# Patient Record
Sex: FEMALE | Race: WHITE | NOT HISPANIC OR LATINO | Employment: OTHER | URBAN - METROPOLITAN AREA
[De-identification: names, ages, dates, MRNs, and addresses within clinical notes are randomized per-mention and may not be internally consistent; named-entity substitution may affect disease eponyms.]

---

## 2023-10-17 ENCOUNTER — TELEPHONE (OUTPATIENT)
Dept: PRIMARY CARE | Facility: CLINIC | Age: 63
End: 2023-10-17
Payer: COMMERCIAL

## 2023-10-17 DIAGNOSIS — I10 PRIMARY HYPERTENSION: ICD-10-CM

## 2023-10-17 DIAGNOSIS — Z00.00 WELL ADULT EXAM: Primary | ICD-10-CM

## 2023-10-17 DIAGNOSIS — E03.9 HYPOTHYROIDISM, UNSPECIFIED TYPE: ICD-10-CM

## 2023-10-17 DIAGNOSIS — E78.00 HYPERCHOLESTEROLEMIA: ICD-10-CM

## 2023-10-25 DIAGNOSIS — G43.909 MIGRAINE WITHOUT STATUS MIGRAINOSUS, NOT INTRACTABLE, UNSPECIFIED MIGRAINE TYPE: ICD-10-CM

## 2023-10-25 RX ORDER — SUMATRIPTAN 20 MG/1
SPRAY NASAL
Qty: 18 EACH | Refills: 0 | Status: SHIPPED | OUTPATIENT
Start: 2023-10-25 | End: 2023-12-07 | Stop reason: SDUPTHER

## 2023-12-04 ENCOUNTER — LAB (OUTPATIENT)
Dept: LAB | Facility: LAB | Age: 63
End: 2023-12-04
Payer: COMMERCIAL

## 2023-12-04 DIAGNOSIS — E78.00 HYPERCHOLESTEROLEMIA: ICD-10-CM

## 2023-12-04 DIAGNOSIS — E03.9 HYPOTHYROIDISM, UNSPECIFIED TYPE: ICD-10-CM

## 2023-12-04 DIAGNOSIS — Z00.00 WELL ADULT EXAM: ICD-10-CM

## 2023-12-04 DIAGNOSIS — I10 PRIMARY HYPERTENSION: ICD-10-CM

## 2023-12-04 LAB
ALBUMIN SERPL-MCNC: 4.2 G/DL (ref 3.5–5)
ALP BLD-CCNC: 61 U/L (ref 35–125)
ALT SERPL-CCNC: 13 U/L (ref 5–40)
ANION GAP SERPL CALC-SCNC: 12 MMOL/L
AST SERPL-CCNC: 15 U/L (ref 5–40)
BASOPHILS # BLD AUTO: 0.05 X10*3/UL (ref 0–0.1)
BASOPHILS NFR BLD AUTO: 0.8 %
BILIRUB SERPL-MCNC: 0.4 MG/DL (ref 0.1–1.2)
BUN SERPL-MCNC: 14 MG/DL (ref 8–25)
CALCIUM SERPL-MCNC: 9.7 MG/DL (ref 8.5–10.4)
CHLORIDE SERPL-SCNC: 104 MMOL/L (ref 97–107)
CHOLEST SERPL-MCNC: 164 MG/DL (ref 133–200)
CHOLEST/HDLC SERPL: 3.5 {RATIO}
CO2 SERPL-SCNC: 26 MMOL/L (ref 24–31)
CREAT SERPL-MCNC: 0.9 MG/DL (ref 0.4–1.6)
EOSINOPHIL # BLD AUTO: 0.14 X10*3/UL (ref 0–0.7)
EOSINOPHIL NFR BLD AUTO: 2.2 %
ERYTHROCYTE [DISTWIDTH] IN BLOOD BY AUTOMATED COUNT: 12.3 % (ref 11.5–14.5)
GFR SERPL CREATININE-BSD FRML MDRD: 72 ML/MIN/1.73M*2
GLUCOSE SERPL-MCNC: 85 MG/DL (ref 65–99)
HCT VFR BLD AUTO: 50.4 % (ref 36–46)
HDLC SERPL-MCNC: 47 MG/DL
HGB BLD-MCNC: 15.8 G/DL (ref 12–16)
IMM GRANULOCYTES # BLD AUTO: 0.02 X10*3/UL (ref 0–0.7)
IMM GRANULOCYTES NFR BLD AUTO: 0.3 % (ref 0–0.9)
LDLC SERPL CALC-MCNC: 96 MG/DL (ref 65–130)
LYMPHOCYTES # BLD AUTO: 2.53 X10*3/UL (ref 1.2–4.8)
LYMPHOCYTES NFR BLD AUTO: 39.1 %
MCH RBC QN AUTO: 30 PG (ref 26–34)
MCHC RBC AUTO-ENTMCNC: 31.3 G/DL (ref 32–36)
MCV RBC AUTO: 96 FL (ref 80–100)
MONOCYTES # BLD AUTO: 0.84 X10*3/UL (ref 0.1–1)
MONOCYTES NFR BLD AUTO: 13 %
NEUTROPHILS # BLD AUTO: 2.89 X10*3/UL (ref 1.2–7.7)
NEUTROPHILS NFR BLD AUTO: 44.6 %
NRBC BLD-RTO: 0 /100 WBCS (ref 0–0)
PLATELET # BLD AUTO: 231 X10*3/UL (ref 150–450)
POTASSIUM SERPL-SCNC: 4.7 MMOL/L (ref 3.4–5.1)
PROT SERPL-MCNC: 7.1 G/DL (ref 5.9–7.9)
RBC # BLD AUTO: 5.27 X10*6/UL (ref 4–5.2)
SODIUM SERPL-SCNC: 142 MMOL/L (ref 133–145)
TRIGL SERPL-MCNC: 107 MG/DL (ref 40–150)
TSH SERPL DL<=0.05 MIU/L-ACNC: 1.46 MIU/L (ref 0.27–4.2)
WBC # BLD AUTO: 6.5 X10*3/UL (ref 4.4–11.3)

## 2023-12-04 PROCEDURE — 85025 COMPLETE CBC W/AUTO DIFF WBC: CPT

## 2023-12-04 PROCEDURE — 80061 LIPID PANEL: CPT

## 2023-12-04 PROCEDURE — 36415 COLL VENOUS BLD VENIPUNCTURE: CPT

## 2023-12-04 PROCEDURE — 84443 ASSAY THYROID STIM HORMONE: CPT

## 2023-12-04 PROCEDURE — 80053 COMPREHEN METABOLIC PANEL: CPT

## 2023-12-06 ENCOUNTER — OFFICE VISIT (OUTPATIENT)
Dept: PRIMARY CARE | Facility: CLINIC | Age: 63
End: 2023-12-06
Payer: COMMERCIAL

## 2023-12-06 VITALS
TEMPERATURE: 98.2 F | BODY MASS INDEX: 35.97 KG/M2 | SYSTOLIC BLOOD PRESSURE: 122 MMHG | HEART RATE: 73 BPM | OXYGEN SATURATION: 95 % | DIASTOLIC BLOOD PRESSURE: 80 MMHG | HEIGHT: 63 IN | WEIGHT: 203 LBS

## 2023-12-06 DIAGNOSIS — B07.9 VERRUCA: Primary | ICD-10-CM

## 2023-12-06 DIAGNOSIS — M79.645 FINGER PAIN, LEFT: ICD-10-CM

## 2023-12-06 PROBLEM — M75.101 TEAR OF RIGHT ROTATOR CUFF: Status: RESOLVED | Noted: 2023-12-06 | Resolved: 2023-12-06

## 2023-12-06 PROBLEM — K57.32 DIVERTICULITIS OF LARGE INTESTINE: Status: RESOLVED | Noted: 2019-12-11 | Resolved: 2023-12-06

## 2023-12-06 PROBLEM — C85.90 LYMPHOMA (MULTI): Status: ACTIVE | Noted: 2021-09-09

## 2023-12-06 PROBLEM — J44.9 CHRONIC OBSTRUCTIVE LUNG DISEASE (MULTI): Status: ACTIVE | Noted: 2023-12-06

## 2023-12-06 PROBLEM — E78.00 HYPERCHOLESTEREMIA: Status: ACTIVE | Noted: 2018-08-07

## 2023-12-06 PROBLEM — C91.10 CHRONIC LYMPHOCYTIC LEUKEMIA (MULTI): Status: ACTIVE | Noted: 2022-06-07

## 2023-12-06 PROBLEM — F32.A DEPRESSION: Status: ACTIVE | Noted: 2023-12-06

## 2023-12-06 PROBLEM — M85.80 OSTEOPENIA: Status: ACTIVE | Noted: 2023-12-06

## 2023-12-06 PROBLEM — M51.36 DDD (DEGENERATIVE DISC DISEASE), LUMBAR: Status: ACTIVE | Noted: 2023-12-06

## 2023-12-06 PROBLEM — Z90.49 S/P COLON RESECTION: Status: ACTIVE | Noted: 2023-12-06

## 2023-12-06 PROBLEM — Z90.710 S/P HYSTERECTOMY: Status: ACTIVE | Noted: 2023-12-06

## 2023-12-06 PROBLEM — F33.1 MODERATE EPISODE OF RECURRENT MAJOR DEPRESSIVE DISORDER (MULTI): Status: ACTIVE | Noted: 2019-11-06

## 2023-12-06 PROBLEM — Z85.828 HX OF BASAL CELL CARCINOMA: Status: ACTIVE | Noted: 2023-12-06

## 2023-12-06 PROBLEM — D83.0 COMMON VARIABLE IMMUNODEFICIENCY WITH PREDOMINANT ABNORMALITIES OF B-CELL NUMBERS AND FUNCTION (MULTI): Status: ACTIVE | Noted: 2023-12-06

## 2023-12-06 PROBLEM — N18.2 CHRONIC KIDNEY DISEASE, STAGE II (MILD): Status: ACTIVE | Noted: 2021-09-09

## 2023-12-06 PROBLEM — F17.211 CIGARETTE NICOTINE DEPENDENCE IN REMISSION: Status: ACTIVE | Noted: 2023-12-06

## 2023-12-06 PROBLEM — K40.20 BILATERAL INGUINAL HERNIA: Status: ACTIVE | Noted: 2023-12-06

## 2023-12-06 PROBLEM — M79.672 LEFT FOOT PAIN: Status: RESOLVED | Noted: 2019-03-28 | Resolved: 2023-12-06

## 2023-12-06 PROBLEM — E78.6 HDL DEFICIENCY: Status: ACTIVE | Noted: 2022-06-07

## 2023-12-06 PROBLEM — C44.91 BASAL CELL CARCINOMA: Status: ACTIVE | Noted: 2023-12-06

## 2023-12-06 PROBLEM — R29.818 NEUROGENIC CLAUDICATION: Status: ACTIVE | Noted: 2023-12-06

## 2023-12-06 PROBLEM — G89.29 OTHER CHRONIC PAIN: Status: ACTIVE | Noted: 2023-12-06

## 2023-12-06 PROBLEM — I10 ESSENTIAL HYPERTENSION: Status: ACTIVE | Noted: 2018-08-07

## 2023-12-06 PROBLEM — D47.2 MONOCLONAL GAMMOPATHY OF UNKNOWN SIGNIFICANCE (MGUS): Status: ACTIVE | Noted: 2023-12-06

## 2023-12-06 PROBLEM — R23.2 HOT FLASHES: Status: ACTIVE | Noted: 2023-12-06

## 2023-12-06 PROBLEM — S32.10XA SACRAL FRACTURE, CLOSED (MULTI): Status: RESOLVED | Noted: 2023-12-06 | Resolved: 2023-12-06

## 2023-12-06 PROBLEM — D84.9 IMMUNOLOGIC DEFICIENCY SYNDROME (MULTI): Status: ACTIVE | Noted: 2019-12-11

## 2023-12-06 PROBLEM — M79.661 PAIN IN RIGHT LOWER LEG: Status: RESOLVED | Noted: 2021-10-29 | Resolved: 2023-12-06

## 2023-12-06 PROBLEM — E66.9 OBESITY: Status: ACTIVE | Noted: 2019-09-11

## 2023-12-06 PROBLEM — J01.00 ACUTE MAXILLARY SINUSITIS: Status: RESOLVED | Noted: 2019-01-14 | Resolved: 2023-12-06

## 2023-12-06 PROBLEM — N95.1 MENOPAUSAL HOT FLUSHES: Status: ACTIVE | Noted: 2020-05-14

## 2023-12-06 PROBLEM — B07.0 PLANTAR WART OF LEFT FOOT: Status: ACTIVE | Noted: 2019-03-28

## 2023-12-06 PROBLEM — R05.9 COUGH: Status: RESOLVED | Noted: 2019-01-13 | Resolved: 2023-12-06

## 2023-12-06 PROBLEM — M19.019 PRIMARY LOCALIZED OSTEOARTHROSIS OF SHOULDER REGION: Status: ACTIVE | Noted: 2023-12-06

## 2023-12-06 PROBLEM — J45.30 MILD PERSISTENT ASTHMA (HHS-HCC): Status: ACTIVE | Noted: 2023-12-06

## 2023-12-06 PROBLEM — R59.1 LYMPHADENOPATHY: Status: ACTIVE | Noted: 2022-08-23

## 2023-12-06 PROBLEM — Z85.72 HX OF NON-HODGKIN'S LYMPHOMA: Status: ACTIVE | Noted: 2023-12-06

## 2023-12-06 PROBLEM — K57.90 DIVERTICULOSIS: Status: ACTIVE | Noted: 2023-12-06

## 2023-12-06 PROBLEM — L03.116 LEFT LEG CELLULITIS: Status: RESOLVED | Noted: 2021-11-22 | Resolved: 2023-12-06

## 2023-12-06 PROBLEM — M35.00 SICCA SYNDROME (MULTI): Status: ACTIVE | Noted: 2023-12-06

## 2023-12-06 PROBLEM — G47.00 INSOMNIA: Status: ACTIVE | Noted: 2019-01-14

## 2023-12-06 PROBLEM — M54.51 VERTEBROGENIC LOW BACK PAIN: Status: ACTIVE | Noted: 2023-12-06

## 2023-12-06 PROBLEM — S39.012A BACK STRAIN: Status: RESOLVED | Noted: 2023-12-06 | Resolved: 2023-12-06

## 2023-12-06 PROBLEM — R73.01 IFG (IMPAIRED FASTING GLUCOSE): Status: ACTIVE | Noted: 2022-06-07

## 2023-12-06 PROBLEM — E78.5 DYSLIPIDEMIA: Status: ACTIVE | Noted: 2023-12-06

## 2023-12-06 PROBLEM — M51.369 DDD (DEGENERATIVE DISC DISEASE), LUMBAR: Status: ACTIVE | Noted: 2023-12-06

## 2023-12-06 PROBLEM — F33.42 RECURRENT MAJOR DEPRESSION IN FULL REMISSION (CMS-HCC): Status: ACTIVE | Noted: 2020-05-14

## 2023-12-06 PROBLEM — K64.8 INTERNAL HEMORRHOIDS: Status: ACTIVE | Noted: 2023-12-06

## 2023-12-06 PROBLEM — B99.9 INFECTION: Status: RESOLVED | Noted: 2023-12-06 | Resolved: 2023-12-06

## 2023-12-06 PROBLEM — M47.817 SPONDYLOSIS WITHOUT MYELOPATHY OR RADICULOPATHY, LUMBOSACRAL REGION: Status: ACTIVE | Noted: 2023-12-06

## 2023-12-06 PROBLEM — I12.9 MALIGNANT HYPERTENSIVE CHRONIC KIDNEY DISEASE: Status: ACTIVE | Noted: 2023-12-06

## 2023-12-06 PROCEDURE — 1036F TOBACCO NON-USER: CPT | Performed by: FAMILY MEDICINE

## 2023-12-06 PROCEDURE — 99212 OFFICE O/P EST SF 10 MIN: CPT | Performed by: FAMILY MEDICINE

## 2023-12-06 PROCEDURE — 3074F SYST BP LT 130 MM HG: CPT | Performed by: FAMILY MEDICINE

## 2023-12-06 PROCEDURE — 3079F DIAST BP 80-89 MM HG: CPT | Performed by: FAMILY MEDICINE

## 2023-12-06 RX ORDER — DIPHENHYDRAMINE HCL/ZINC ACET 2 %-0.1 %
AEROSOL, SPRAY (GRAM) TOPICAL
COMMUNITY
End: 2023-12-06 | Stop reason: ALTCHOICE

## 2023-12-06 RX ORDER — LOSARTAN POTASSIUM AND HYDROCHLOROTHIAZIDE 12.5; 5 MG/1; MG/1
TABLET ORAL
COMMUNITY
End: 2023-12-06 | Stop reason: ALTCHOICE

## 2023-12-06 RX ORDER — FLUTICASONE PROPIONATE 50 MCG
SPRAY, SUSPENSION (ML) NASAL EVERY 24 HOURS
COMMUNITY
Start: 2016-05-31

## 2023-12-06 RX ORDER — HYDROXYZINE PAMOATE 25 MG/1
25 CAPSULE ORAL DAILY
COMMUNITY
Start: 2023-06-05 | End: 2023-12-06 | Stop reason: ALTCHOICE

## 2023-12-06 RX ORDER — LOSARTAN POTASSIUM 50 MG/1
TABLET ORAL
COMMUNITY
End: 2023-12-06 | Stop reason: ALTCHOICE

## 2023-12-06 RX ORDER — ESTRADIOL 1 MG/1
TABLET ORAL EVERY 24 HOURS
COMMUNITY
End: 2023-12-06 | Stop reason: ALTCHOICE

## 2023-12-06 RX ORDER — ZINC GLUCONATE 50 MG
TABLET ORAL
COMMUNITY
Start: 2022-05-24

## 2023-12-06 RX ORDER — LEVOTHYROXINE SODIUM 75 UG/1
TABLET ORAL
COMMUNITY
Start: 2020-11-27 | End: 2023-12-07 | Stop reason: SDUPTHER

## 2023-12-06 RX ORDER — NYSTATIN 100000 [USP'U]/G
POWDER TOPICAL
COMMUNITY
Start: 2019-09-27

## 2023-12-06 RX ORDER — ASPIRIN 81 MG/1
TABLET ORAL EVERY 24 HOURS
COMMUNITY
Start: 2018-09-25

## 2023-12-06 RX ORDER — PERMETHRIN 50 MG/G
CREAM TOPICAL
COMMUNITY
Start: 2016-09-30 | End: 2023-12-06 | Stop reason: ALTCHOICE

## 2023-12-06 RX ORDER — PRAVASTATIN SODIUM 10 MG/1
TABLET ORAL EVERY 24 HOURS
COMMUNITY
Start: 2022-02-07 | End: 2023-12-07 | Stop reason: SDUPTHER

## 2023-12-06 RX ORDER — NAPROXEN SODIUM 220 MG/1
1 TABLET ORAL EVERY 24 HOURS
COMMUNITY

## 2023-12-06 RX ORDER — GUAIFENESIN AND PHENYLEPHRINE HCL 400; 10 MG/1; MG/1
TABLET ORAL
COMMUNITY

## 2023-12-06 RX ORDER — BUPROPION HYDROCHLORIDE 150 MG/1
TABLET ORAL
COMMUNITY
Start: 2022-05-03

## 2023-12-06 RX ORDER — FLUOXETINE 20 MG/1
30 TABLET ORAL
COMMUNITY
Start: 2022-01-28 | End: 2023-12-06 | Stop reason: ALTCHOICE

## 2023-12-06 RX ORDER — AMITRIPTYLINE HYDROCHLORIDE 25 MG/1
TABLET, FILM COATED ORAL
COMMUNITY
End: 2023-12-06 | Stop reason: ALTCHOICE

## 2023-12-06 RX ORDER — MULTIVITAMIN WITH IRON
TABLET ORAL
COMMUNITY
Start: 2022-05-24 | End: 2023-12-06 | Stop reason: ALTCHOICE

## 2023-12-06 RX ORDER — BLACK COHOSH ROOT 540 MG
CAPSULE ORAL
COMMUNITY
End: 2023-12-06 | Stop reason: ALTCHOICE

## 2023-12-06 RX ORDER — ACETAMINOPHEN 500 MG
1 TABLET ORAL DAILY
COMMUNITY
Start: 2022-05-24

## 2023-12-06 RX ORDER — ALBUTEROL SULFATE 0.83 MG/ML
SOLUTION RESPIRATORY (INHALATION)
COMMUNITY
Start: 2023-09-07

## 2023-12-06 RX ORDER — HYDROCODONE BITARTRATE AND ACETAMINOPHEN 5; 325 MG/1; MG/1
TABLET ORAL EVERY 24 HOURS
COMMUNITY
Start: 2023-08-01

## 2023-12-06 RX ORDER — MELOXICAM 15 MG/1
TABLET ORAL
COMMUNITY
Start: 2020-11-09 | End: 2023-12-06 | Stop reason: ALTCHOICE

## 2023-12-06 RX ORDER — METHOCARBAMOL 500 MG/1
TABLET, FILM COATED ORAL EVERY 4 HOURS
COMMUNITY
End: 2023-12-06 | Stop reason: ALTCHOICE

## 2023-12-06 RX ORDER — BUDESONIDE AND FORMOTEROL FUMARATE DIHYDRATE 160; 4.5 UG/1; UG/1
AEROSOL RESPIRATORY (INHALATION)
COMMUNITY
Start: 2021-08-09

## 2023-12-06 RX ORDER — ASCORBIC ACID 500 MG
TABLET ORAL
COMMUNITY
Start: 2022-05-24

## 2023-12-06 RX ORDER — BECLOMETHASONE DIPROPIONATE HFA 40 UG/1
AEROSOL, METERED RESPIRATORY (INHALATION)
COMMUNITY
Start: 2018-09-30

## 2023-12-06 RX ORDER — ALBUTEROL SULFATE 90 UG/1
2 AEROSOL, METERED RESPIRATORY (INHALATION) EVERY 4 HOURS
COMMUNITY

## 2023-12-06 RX ORDER — EPINEPHRINE 0.22MG
AEROSOL WITH ADAPTER (ML) INHALATION EVERY 24 HOURS
COMMUNITY
Start: 2016-05-31 | End: 2023-12-06 | Stop reason: ALTCHOICE

## 2023-12-06 RX ORDER — DOXYCYCLINE HYCLATE 100 MG
100 TABLET ORAL 2 TIMES DAILY
COMMUNITY
Start: 2023-09-07 | End: 2023-12-06 | Stop reason: ALTCHOICE

## 2023-12-06 RX ORDER — GABAPENTIN 100 MG/1
CAPSULE ORAL
COMMUNITY
Start: 2023-10-04 | End: 2023-12-06 | Stop reason: ALTCHOICE

## 2023-12-06 RX ORDER — LISINOPRIL 20 MG/1
TABLET ORAL EVERY 24 HOURS
COMMUNITY
Start: 2021-06-24 | End: 2023-12-07 | Stop reason: SDUPTHER

## 2023-12-06 RX ORDER — POTASSIUM CHLORIDE 20 MEQ/1
TABLET, EXTENDED RELEASE ORAL EVERY 12 HOURS
COMMUNITY
End: 2023-12-06 | Stop reason: ALTCHOICE

## 2023-12-06 RX ORDER — AZITHROMYCIN 250 MG/1
TABLET, FILM COATED ORAL DAILY
COMMUNITY

## 2023-12-06 RX ORDER — MULTIVITAMIN/IRON/FOLIC ACID 18MG-0.4MG
TABLET ORAL
COMMUNITY

## 2023-12-06 ASSESSMENT — PAIN SCALES - GENERAL: PAINLEVEL: 8

## 2023-12-06 ASSESSMENT — PATIENT HEALTH QUESTIONNAIRE - PHQ9
2. FEELING DOWN, DEPRESSED OR HOPELESS: NOT AT ALL
SUM OF ALL RESPONSES TO PHQ9 QUESTIONS 1 AND 2: 0
1. LITTLE INTEREST OR PLEASURE IN DOING THINGS: NOT AT ALL

## 2023-12-06 NOTE — PROGRESS NOTES
"Subjective   Patient ID: oMnica Durham is a 62 y.o. female who presents for wart removal.    Cryotherapy Procedure Note    Pre-operative Diagnosis: verruca    Post-operative Diagnosis: verruca    Locations: left hand third finger    Indications: pain, chronic (present for 1.5 years)    Anesthesia: not required       Procedure Details   History of allergy to iodine: no.  Pacemaker? no.    Patient informed of risks (permanent scarring, infection, light or dark discoloration, bleeding, infection, weakness, numbness and recurrence of the lesion) and benefits of the procedure and verbal informed consent obtained.    The areas are treated with liquid nitrogen therapy, frozen until ice ball extended 1-2 mm beyond lesion, allowed to thaw, and treated again. The patient tolerated procedure well.  The patient was instructed on post-op care, warned that there may be blister formation, redness and pain. Recommend OTC analgesia as needed for pain.    /80 (BP Location: Left arm)   Pulse 73   Temp 36.8 °C (98.2 °F) (Temporal)   Ht 1.6 m (5' 3\")   Wt 92.1 kg (203 lb)   SpO2 95%   BMI 35.96 kg/m²     Condition:  Stable    Complications:  none.    Plan:  1. Instructed to keep the area dry and covered for 24-48h and clean thereafter.  2. Warning signs of infection were reviewed.    3. Recommended that the patient use OTC analgesics as needed for pain.   4. Return in 4 weeks.     "

## 2023-12-07 ENCOUNTER — TELEPHONE (OUTPATIENT)
Dept: PRIMARY CARE | Facility: CLINIC | Age: 63
End: 2023-12-07

## 2023-12-07 ENCOUNTER — OFFICE VISIT (OUTPATIENT)
Dept: PRIMARY CARE | Facility: CLINIC | Age: 63
End: 2023-12-07
Payer: COMMERCIAL

## 2023-12-07 VITALS
WEIGHT: 203 LBS | DIASTOLIC BLOOD PRESSURE: 82 MMHG | RESPIRATION RATE: 16 BRPM | HEIGHT: 63 IN | SYSTOLIC BLOOD PRESSURE: 124 MMHG | BODY MASS INDEX: 35.97 KG/M2 | OXYGEN SATURATION: 98 % | HEART RATE: 75 BPM

## 2023-12-07 DIAGNOSIS — E03.9 HYPOTHYROIDISM, UNSPECIFIED TYPE: ICD-10-CM

## 2023-12-07 DIAGNOSIS — I10 ESSENTIAL HYPERTENSION: ICD-10-CM

## 2023-12-07 DIAGNOSIS — E78.00 HYPERCHOLESTEREMIA: ICD-10-CM

## 2023-12-07 DIAGNOSIS — G43.009 MIGRAINE WITHOUT AURA AND WITHOUT STATUS MIGRAINOSUS, NOT INTRACTABLE: ICD-10-CM

## 2023-12-07 DIAGNOSIS — F33.42 RECURRENT MAJOR DEPRESSION IN FULL REMISSION (CMS-HCC): ICD-10-CM

## 2023-12-07 DIAGNOSIS — Z00.00 WELL ADULT EXAM: Primary | ICD-10-CM

## 2023-12-07 DIAGNOSIS — Z12.31 ENCOUNTER FOR SCREENING MAMMOGRAM FOR BREAST CANCER: ICD-10-CM

## 2023-12-07 PROCEDURE — 1036F TOBACCO NON-USER: CPT | Performed by: FAMILY MEDICINE

## 2023-12-07 PROCEDURE — 99213 OFFICE O/P EST LOW 20 MIN: CPT | Performed by: FAMILY MEDICINE

## 2023-12-07 PROCEDURE — 99396 PREV VISIT EST AGE 40-64: CPT | Performed by: FAMILY MEDICINE

## 2023-12-07 PROCEDURE — 3079F DIAST BP 80-89 MM HG: CPT | Performed by: FAMILY MEDICINE

## 2023-12-07 PROCEDURE — 3074F SYST BP LT 130 MM HG: CPT | Performed by: FAMILY MEDICINE

## 2023-12-07 RX ORDER — LEVOTHYROXINE SODIUM 75 UG/1
TABLET ORAL
Qty: 90 TABLET | Refills: 3 | Status: SHIPPED | OUTPATIENT
Start: 2023-12-07

## 2023-12-07 RX ORDER — SUMATRIPTAN 20 MG/1
SPRAY NASAL
Qty: 18 EACH | Refills: 2 | Status: SHIPPED | OUTPATIENT
Start: 2023-12-07 | End: 2024-05-29

## 2023-12-07 RX ORDER — GABAPENTIN 100 MG/1
100 CAPSULE ORAL 3 TIMES DAILY
COMMUNITY

## 2023-12-07 RX ORDER — LISINOPRIL 20 MG/1
20 TABLET ORAL DAILY
Qty: 90 TABLET | Refills: 1 | Status: SHIPPED | OUTPATIENT
Start: 2023-12-07 | End: 2024-06-04

## 2023-12-07 RX ORDER — PRAVASTATIN SODIUM 10 MG/1
10 TABLET ORAL NIGHTLY
Qty: 90 TABLET | Refills: 3 | Status: SHIPPED | OUTPATIENT
Start: 2023-12-07 | End: 2024-12-06

## 2023-12-07 ASSESSMENT — PAIN SCALES - GENERAL: PAINLEVEL: 3

## 2023-12-07 NOTE — PROGRESS NOTES
"Subjective   Patient ID: Monica Durham is a 62 y.o. female who presents for Annual Exam.    COVID-19 and tetanus (2016) vaccinations are UTD.  She declines influenza and shingles vaccinations.  She had a hysterectomy in 2004.  She requests an order for a screening mammogram.  DEXA is UTD.  She had a colonoscopy in 2019 performed by Dr. White.  She had one benign polyp.  She was told to repeat the colonoscopy in 10 years.  EKG is UTD.  Fasting labs were completed recently.   1) Depression: stable, under the care of psychiatrist, compliant with medications.  2) HTN: controlled, blood pressure at goal, checks blood pressure at home and usually in the 110s/70s, compliant with medication, tries to follow a low salt diet, exercises at least 4-5 days a week (aerobics and yoga).  3) Hypercholesterolemia: uncontrolled, HDL low, compliant with medication, taking fish oil last year, tries to follow a low fat diet, exercises daily.   4) Hypothyroidism: controlled, TSH normal, compliant with medication, asymptomatic.  5) Migraines: stable, usually has migraine after monthly infusions at immunologist's office, sumatriptan nasal spray helps.    Review of Systems   Constitutional: Negative.    HENT: Negative.     Eyes: Negative.    Respiratory: Negative.     Cardiovascular: Negative.    Gastrointestinal: Negative.    Genitourinary: Negative.    Musculoskeletal: Negative.    Skin: Negative.    Neurological: Negative.    Hematological: Negative.    Psychiatric/Behavioral: Negative.       Objective   /82   Pulse 75   Resp 16   Ht 1.6 m (5' 3\")   Wt 92.1 kg (203 lb)   SpO2 98%   BMI 35.96 kg/m²     Physical Exam  Vitals reviewed.   Constitutional:       Appearance: Normal appearance. She is obese.   HENT:      Head: Normocephalic and atraumatic.      Right Ear: Tympanic membrane, ear canal and external ear normal.      Left Ear: Tympanic membrane, ear canal and external ear normal.      Nose: Nose normal.      " Mouth/Throat:      Mouth: Mucous membranes are moist.      Pharynx: Oropharynx is clear.   Eyes:      Extraocular Movements: Extraocular movements intact.      Conjunctiva/sclera: Conjunctivae normal.      Pupils: Pupils are equal, round, and reactive to light.   Cardiovascular:      Rate and Rhythm: Normal rate and regular rhythm.      Pulses: Normal pulses.      Heart sounds: Normal heart sounds.   Pulmonary:      Effort: Pulmonary effort is normal.      Breath sounds: Normal breath sounds.   Chest:   Breasts:     Right: Normal.      Left: Normal.   Abdominal:      General: Bowel sounds are normal.      Palpations: Abdomen is soft.   Musculoskeletal:         General: Normal range of motion.      Cervical back: Normal range of motion and neck supple.   Lymphadenopathy:      Upper Body:      Right upper body: No axillary adenopathy.      Left upper body: No axillary adenopathy.   Skin:     General: Skin is warm and dry.   Neurological:      General: No focal deficit present.      Mental Status: She is alert and oriented to person, place, and time.   Psychiatric:         Mood and Affect: Mood normal.         Behavior: Behavior normal.         Thought Content: Thought content normal.         Judgment: Judgment normal.     Assessment/Plan   Diagnoses and all orders for this visit:  Well adult exam  PE completed.  COVID-19 and tetanus vaccinations UTD.  Patient declines influenza and shingles vaccinations.  Screening mammogram ordered.  Await results.  DEXA UTD.  Colon cancer screening UTD.  EKG UTD.  Fasting labs completed.    Repeat exam in 1 year.   Recurrent major depression in full remission (CMS/HCC)  Stable.  Continue with medications.  Keep follow-up appointments with psychiatrist.  Follow up in 1 year.  Essential hypertension  Controlled.  Continue to check blood pressure at home.  Lisinopril 20 mg tablet refilled.  Low salt diet.  Regular exercise.  Manage weight.  Follow up in 6 months.    Hypercholesteremia  Uncontrolled.  Pravastatin (Pravachol) 10 mg tablet refilled.  Patient may resume daily fish oil.     Low fat diet.  Regular exercise.  Manage weight.  Follow up in 1 year,  Hypothyroidism, unspecified type  Controlled.  Levothyroxine (Synthroid, Levoxyl) 75 mcg tablet refilled.  Follow up in 1 year.  Migraine without aura and without status migrainosus, not intractable  Stable.  SUMAtriptan (Imitrex) 20 mg/actuation nasal spray refilled.  Follow up in 1 year.  Encounter for screening mammogram for breast cancer  -     BI mammo bilateral screening tomosynthesis; Future  Other orders  -     Follow Up In Primary Care - Established; Future

## 2023-12-13 ASSESSMENT — ENCOUNTER SYMPTOMS
MUSCULOSKELETAL NEGATIVE: 1
CONSTITUTIONAL NEGATIVE: 1
PSYCHIATRIC NEGATIVE: 1
EYES NEGATIVE: 1
HEMATOLOGIC/LYMPHATIC NEGATIVE: 1
GASTROINTESTINAL NEGATIVE: 1
CARDIOVASCULAR NEGATIVE: 1
NEUROLOGICAL NEGATIVE: 1
RESPIRATORY NEGATIVE: 1

## 2024-01-09 ENCOUNTER — TELEPHONE (OUTPATIENT)
Dept: PRIMARY CARE | Facility: CLINIC | Age: 64
End: 2024-01-09
Payer: COMMERCIAL

## 2024-01-09 DIAGNOSIS — E78.00 HYPERCHOLESTEREMIA: ICD-10-CM

## 2024-01-09 RX ORDER — PRAVASTATIN SODIUM 10 MG/1
10 TABLET ORAL NIGHTLY
Qty: 90 TABLET | Refills: 3 | OUTPATIENT
Start: 2024-01-09 | End: 2025-01-08

## 2024-01-10 ENCOUNTER — OFFICE VISIT (OUTPATIENT)
Dept: PRIMARY CARE | Facility: CLINIC | Age: 64
End: 2024-01-10
Payer: COMMERCIAL

## 2024-01-10 VITALS — DIASTOLIC BLOOD PRESSURE: 80 MMHG | SYSTOLIC BLOOD PRESSURE: 140 MMHG | OXYGEN SATURATION: 99 % | HEART RATE: 81 BPM

## 2024-01-10 DIAGNOSIS — B07.9 VERRUCA: Primary | ICD-10-CM

## 2024-01-10 PROCEDURE — 3077F SYST BP >= 140 MM HG: CPT | Performed by: FAMILY MEDICINE

## 2024-01-10 PROCEDURE — 1036F TOBACCO NON-USER: CPT | Performed by: FAMILY MEDICINE

## 2024-01-10 PROCEDURE — 3079F DIAST BP 80-89 MM HG: CPT | Performed by: FAMILY MEDICINE

## 2024-01-10 RX ORDER — BUDESONIDE 0.5 MG/2ML
INHALANT ORAL
COMMUNITY
Start: 2024-01-05

## 2024-01-10 ASSESSMENT — PAIN SCALES - GENERAL: PAINLEVEL: 0-NO PAIN

## 2024-01-10 ASSESSMENT — PATIENT HEALTH QUESTIONNAIRE - PHQ9
2. FEELING DOWN, DEPRESSED OR HOPELESS: NOT AT ALL
1. LITTLE INTEREST OR PLEASURE IN DOING THINGS: NOT AT ALL
SUM OF ALL RESPONSES TO PHQ9 QUESTIONS 1 AND 2: 0

## 2024-01-10 NOTE — PROGRESS NOTES
Cryotherapy Procedure Note    Pre-operative Diagnosis: verruca    Post-operative Diagnosis: verruca    Locations: left hand third finger    Indications: pain, chronic (present for 1.5 years)    Anesthesia: not required       Procedure Details   History of allergy to iodine: no.  Pacemaker? no.    Patient informed of risks (permanent scarring, infection, light or dark discoloration, bleeding, infection, weakness, numbness and recurrence of the lesion) and benefits of the procedure and verbal informed consent obtained.    The areas are treated with liquid nitrogen therapy, frozen until ice ball extended 1-2 mm beyond lesion, allowed to thaw, and treated again. The patient tolerated procedure well.  The patient was instructed on post-op care, warned that there may be blister formation, redness and pain. Recommend OTC analgesia as needed for pain.    Condition:  Stable    Complications:  none.    Plan:  1. Instructed to keep the area dry and clean thereafter.  Covered with adhesive bandage.  2. Warning signs of infection were reviewed.    3. Recommended that the patient use OTC analgesics as needed for pain.   4. May use duct tape or salicylic acid between visits.  5. Return in 4 weeks.

## 2024-01-24 ENCOUNTER — APPOINTMENT (OUTPATIENT)
Dept: RADIOLOGY | Facility: CLINIC | Age: 64
End: 2024-01-24
Payer: COMMERCIAL

## 2024-01-26 ENCOUNTER — HOSPITAL ENCOUNTER (OUTPATIENT)
Dept: RADIOLOGY | Facility: CLINIC | Age: 64
Discharge: HOME | End: 2024-01-26
Payer: COMMERCIAL

## 2024-01-26 VITALS — HEIGHT: 63 IN | BODY MASS INDEX: 35.79 KG/M2 | WEIGHT: 202 LBS

## 2024-01-26 DIAGNOSIS — Z12.31 ENCOUNTER FOR SCREENING MAMMOGRAM FOR BREAST CANCER: ICD-10-CM

## 2024-01-26 PROCEDURE — 77067 SCR MAMMO BI INCL CAD: CPT

## 2024-01-29 ENCOUNTER — OFFICE VISIT (OUTPATIENT)
Dept: ORTHOPEDIC SURGERY | Facility: CLINIC | Age: 64
End: 2024-01-29
Payer: COMMERCIAL

## 2024-01-29 ENCOUNTER — HOSPITAL ENCOUNTER (OUTPATIENT)
Dept: RADIOLOGY | Facility: CLINIC | Age: 64
Discharge: HOME | End: 2024-01-29
Payer: COMMERCIAL

## 2024-01-29 DIAGNOSIS — M79.602 ARM PAIN, LEFT: ICD-10-CM

## 2024-01-29 DIAGNOSIS — M75.102 TEAR OF LEFT ROTATOR CUFF, UNSPECIFIED TEAR EXTENT, UNSPECIFIED WHETHER TRAUMATIC: ICD-10-CM

## 2024-01-29 DIAGNOSIS — M25.512 LEFT SHOULDER PAIN, UNSPECIFIED CHRONICITY: ICD-10-CM

## 2024-01-29 DIAGNOSIS — R29.898 LEFT ARM WEAKNESS: ICD-10-CM

## 2024-01-29 PROCEDURE — 1036F TOBACCO NON-USER: CPT | Performed by: ORTHOPAEDIC SURGERY

## 2024-01-29 PROCEDURE — 73030 X-RAY EXAM OF SHOULDER: CPT | Mod: LT

## 2024-01-29 PROCEDURE — 99213 OFFICE O/P EST LOW 20 MIN: CPT | Performed by: ORTHOPAEDIC SURGERY

## 2024-01-29 ASSESSMENT — ENCOUNTER SYMPTOMS
WHEEZING: 0
SHORTNESS OF BREATH: 0
CHILLS: 0
ARTHRALGIAS: 1
FATIGUE: 0
FEVER: 0

## 2024-01-29 ASSESSMENT — PAIN SCALES - GENERAL: PAINLEVEL_OUTOF10: 4

## 2024-01-29 ASSESSMENT — PAIN - FUNCTIONAL ASSESSMENT: PAIN_FUNCTIONAL_ASSESSMENT: 0-10

## 2024-01-29 NOTE — PROGRESS NOTES
Reason for Appointment  Chief Complaint   Patient presents with    Left Shoulder - Pain     Between shoulder and elbow     History of Present Illness  Patient is a 63 y.o. female here today for evaluation of new left shoulder pain. She has a history of a right reverse shoulder replacement from 10/2022 for a rotator cuff tear. The right shoulder is doing very well. X-rays today of the left shoulder show mild arthritis of the joint, moderate AC joint arthritis, there is some scoliosis seen on X-ray. Around 2023, she started to notice throbbing pain in the shoulder that keeps her up at night. She fell 2023 and was treated for two pelvic fractures, so she was taking it easy recently.     Past Medical History:   Diagnosis Date    Acute maxillary sinusitis 2019    Back strain 2023    Non Hodgkin's lymphoma (CMS/HCC)     Sacral fracture, closed (CMS/[facility]) 2023    Skin cancer     Tear of right rotator cuff 2023       Past Surgical History:   Procedure Laterality Date    HYSTERECTOMY      OTHER SURGICAL HISTORY  2022     section    OTHER SURGICAL HISTORY  2022    Hysterectomy       Medication Documentation Review Audit       Reviewed by Marissa Prieto MA (Medical Assistant) on 24 at 1408      Medication Order Taking? Sig Documenting Provider Last Dose Status   albuterol 2.5 mg /3 mL (0.083 %) nebulizer solution [id number] Yes 3 MILLILITER(S) EVERY 4 HOURS AS NEEDED INHALATION Historical Provider, MD Taking Active   albuterol 90 mcg/actuation inhaler [id number] Yes 2 puffs every 4 hours. Historical Provider, MD Taking Active   ascorbic acid (Vitamin C) 500 mg tablet [id number] Yes Take by mouth. Historical Provider, MD Taking Active   aspirin 81 mg EC tablet [id number] Yes once every 24 hours. Historical Provider, MD Taking Active   azithromycin (Zithromax) 250 mg tablet [id number] Yes Take by mouth once daily. Historical Provider, MD Taking Active   beclomethasone HFA (Qvar  RediHaler) 40 mcg/actuation inhaler [id number] Yes 2 puff(s), Inhale, bid, # 1 EA, 3 Refill(s), JASIEL, Type: Maintenance, Pharmacy: [facility]/pharmacy #[id number] Historical Provider, MD Taking Active   budesonide (Pulmicort) 0.5 mg/2 mL nebulizer solution [id number] Yes USE 2 ML DAILY IN SINUS RINSE Historical Provider, MD Taking Active   buPROPion XL (Wellbutrin XL) 150 mg 24 hr tablet [id number] Yes Take by mouth. Historical Provider, MD Taking Active   cholecalciferol (Vitamin D-3) 50 mcg (2,000 unit) capsule [id number] Yes Take 1 capsule (50 mcg) by mouth once daily. Historical Provider, MD Taking Active   fluticasone (Flonase) 50 mcg/actuation nasal spray [id number] Yes once every 24 hours. Historical Provider, MD Taking Active   gabapentin (Neurontin) 100 mg capsule [id number] Yes Take 1 capsule (100 mg) by mouth 3 times a day. Historical Provider, MD Taking Active   HYDROcodone-acetaminophen (Norco) 5-325 mg tablet [id number] Yes once every 24 hours. Historical Provider, MD Taking Active   levothyroxine (Synthroid, Levoxyl) 75 mcg tablet [id number] Yes TAKE 1 TABLET BY MOUTH EVERY DAY BEFORE BREAKFAST Oral for 90 days Ilana Puri,  Taking Active   lisinopril 20 mg tablet [id number] Yes Take 1 tablet (20 mg) by mouth once daily. Ilana Puri, DO Taking Active   multivitamin with minerals iron-free (Complete MV Adult 50 Plus) [id number] Yes as directed Orally Historical MD Sangeeta Taking Active   nystatin (Mycostatin) 100,000 unit/gram powder [id number] Yes 1 application to affected area Externally Twice a day prn for 90 days Hernandez Rutherford MD Taking Active   omega 3-dha-epa-fish oil (Fish OiL) 1,200 (144-216) mg capsule [id number] Yes 1 capsule (1,200 mg) once every 24 hours. Hernandez Rutherford MD Taking Active   pravastatin (Pravachol) 10 mg tablet [id number] Yes Take 1 tablet (10 mg) by mouth once daily at bedtime. Ilana Puri DO Taking Active   SUMAtriptan (Imitrex) 20 mg/actuation nasal spray [id number] Yes  USE 1 SPRAY NASALLY. MAY REPEAT IN 2 HOURS AS NEEDED. MAXIMUM 2 SPRAYS IN 24 HOURS Ilana Puri,  Taking Active   Symbicort 160-4.5 mcg/actuation inhaler [id number] Yes Inhale. Historical Provider, MD Taking Active   turmeric root extract 500 mg capsule [id number] Yes as directed Orally Historical Provider, MD Taking Active   zinc gluconate 50 mg tablet [id number] Yes Take by mouth. Historical Provider, MD Taking Active                    Allergies   Allergen Reactions    Simvastatin Myalgia    Topiramate Itching and Rash       Review of Systems   Constitutional:  Negative for chills, fatigue and fever.   Respiratory:  Negative for shortness of breath and wheezing.    Cardiovascular:  Negative for chest pain and leg swelling.   Musculoskeletal:  Positive for arthralgias.   All other systems reviewed and are negative.    Exam   On exam of the left arm, she has over 150 degrees of elevation, severe weakness in external rotation, markedly positive impingement sign, tender over the joint line, good deltoid function, good pulses, good sensation    Assessment   Encounter Diagnoses   Name Primary?    Left shoulder pain, unspecified chronicity     Tear of left rotator cuff, unspecified tear extent, unspecified whether traumatic     Arm pain, left     Left arm weakness        Plan   She has a history of a large rotator cuff tear on the right and she had an injury about 6 months ago. She has been doing exercises at home for her shoulder replacement on the right which is  doing very well. There is a high likelihood of a surgical rotator cuff tear on the left with her history of injury, continued pain, and weakness on clinical exam. Follow-up after MRI.    Aleta HAINES, attest that this documentation has been prepared under the direction and in the presence of Mayito Mercedes MD. By signing below, Mayito HAINES MD, personally performed the services described in this documentation. All medical record entries made by  the scribe were at my direction and in my presence. I have reviewed the chart and agree that the record reflects my personal performance and is accurate and complete. 01/29/24

## 2024-02-10 ENCOUNTER — HOSPITAL ENCOUNTER (OUTPATIENT)
Dept: RADIOLOGY | Facility: HOSPITAL | Age: 64
Discharge: HOME | End: 2024-02-10
Payer: COMMERCIAL

## 2024-02-10 DIAGNOSIS — M79.602 ARM PAIN, LEFT: ICD-10-CM

## 2024-02-10 DIAGNOSIS — R29.898 LEFT ARM WEAKNESS: ICD-10-CM

## 2024-02-10 DIAGNOSIS — M75.102 TEAR OF LEFT ROTATOR CUFF, UNSPECIFIED TEAR EXTENT, UNSPECIFIED WHETHER TRAUMATIC: ICD-10-CM

## 2024-02-10 PROCEDURE — 73221 MRI JOINT UPR EXTREM W/O DYE: CPT | Mod: LT

## 2024-02-19 ENCOUNTER — OFFICE VISIT (OUTPATIENT)
Dept: ORTHOPEDIC SURGERY | Facility: CLINIC | Age: 64
End: 2024-02-19
Payer: COMMERCIAL

## 2024-02-19 DIAGNOSIS — M25.812 IMPINGEMENT OF LEFT SHOULDER: ICD-10-CM

## 2024-02-19 DIAGNOSIS — M75.102 TEAR OF LEFT ROTATOR CUFF, UNSPECIFIED TEAR EXTENT, UNSPECIFIED WHETHER TRAUMATIC: Primary | ICD-10-CM

## 2024-02-19 PROCEDURE — 1036F TOBACCO NON-USER: CPT | Performed by: ORTHOPAEDIC SURGERY

## 2024-02-19 PROCEDURE — 20611 DRAIN/INJ JOINT/BURSA W/US: CPT | Performed by: ORTHOPAEDIC SURGERY

## 2024-02-19 PROCEDURE — 99213 OFFICE O/P EST LOW 20 MIN: CPT | Performed by: ORTHOPAEDIC SURGERY

## 2024-02-19 RX ORDER — LIDOCAINE HYDROCHLORIDE 10 MG/ML
0.5 INJECTION INFILTRATION; PERINEURAL
Status: COMPLETED | OUTPATIENT
Start: 2024-02-19 | End: 2024-02-19

## 2024-02-19 RX ORDER — TRIAMCINOLONE ACETONIDE 40 MG/ML
40 INJECTION, SUSPENSION INTRA-ARTICULAR; INTRAMUSCULAR
Status: COMPLETED | OUTPATIENT
Start: 2024-02-19 | End: 2024-02-19

## 2024-02-19 RX ADMIN — LIDOCAINE HYDROCHLORIDE 0.5 ML: 10 INJECTION INFILTRATION; PERINEURAL at 14:19

## 2024-02-19 RX ADMIN — TRIAMCINOLONE ACETONIDE 40 MG: 40 INJECTION, SUSPENSION INTRA-ARTICULAR; INTRAMUSCULAR at 14:19

## 2024-02-19 ASSESSMENT — ENCOUNTER SYMPTOMS
WHEEZING: 0
CHILLS: 0
SHORTNESS OF BREATH: 0
FEVER: 0
FATIGUE: 0
ARTHRALGIAS: 1

## 2024-02-19 ASSESSMENT — PAIN SCALES - GENERAL: PAINLEVEL_OUTOF10: 7

## 2024-02-19 ASSESSMENT — PAIN - FUNCTIONAL ASSESSMENT: PAIN_FUNCTIONAL_ASSESSMENT: 0-10

## 2024-02-19 NOTE — PROGRESS NOTES
Reason for Appointment  Chief Complaint   Patient presents with    Left Shoulder - Results     MRI     History of Present Illness  Patient is a 63 y.o. female here today for follow-up evaluation of her left shoulder and MRI review. Most recent X-rays show mild arthritic change. MRI 24 shows a partial thickness tear of the supraspinatus and superior subscapularis, mild joint arthritis. She complains of throbbing, lateral shoulder pain. She has not had an injection to the shoulder. No other updates to PMH, allergies, or medications.     Past Medical History:   Diagnosis Date    Acute maxillary sinusitis 2019    Back strain 2023    Non Hodgkin's lymphoma (CMS/[facility])     Sacral fracture, closed (CMS/[facility]) 2023    Skin cancer     Tear of right rotator cuff 2023       Past Surgical History:   Procedure Laterality Date    HYSTERECTOMY      OTHER SURGICAL HISTORY  2022     section    OTHER SURGICAL HISTORY  2022    Hysterectomy       Medication Documentation Review Audit       Reviewed by Marissa Prieto MA (Medical Assistant) on 24 at 1409      Medication Order Taking? Sig Documenting Provider Last Dose Status   albuterol 2.5 mg /3 mL (0.083 %) nebulizer solution [id number] Yes 3 MILLILITER(S) EVERY 4 HOURS AS NEEDED INHALATION Historical Provider, MD Taking Active   albuterol 90 mcg/actuation inhaler [id number] Yes 2 puffs every 4 hours. Historical Provider, MD Taking Active   ascorbic acid (Vitamin C) 500 mg tablet [id number] Yes Take by mouth. Historical Provider, MD Taking Active   aspirin 81 mg EC tablet [id number] Yes once every 24 hours. Historical Provider, MD Taking Active   azithromycin (Zithromax) 250 mg tablet [id number] Yes Take by mouth once daily. Historical Provider, MD Taking Active   beclomethasone HFA (Qvar RediHaler) 40 mcg/actuation inhaler [id number] Yes 2 puff(s), Inhale, bid, # 1 EA, 3 Refill(s), JASIEL, Type: Maintenance, Pharmacy: [facility]/pharmacy #[id number]  Historical Provider, MD Taking Active   budesonide (Pulmicort) 0.5 mg/2 mL nebulizer solution [id number] Yes USE 2 ML DAILY IN SINUS RINSE Historical Provider, MD Taking Active   buPROPion XL (Wellbutrin XL) 150 mg 24 hr tablet [id number] Yes Take by mouth. Historical Provider, MD Taking Active   cholecalciferol (Vitamin D-3) 50 mcg (2,000 unit) capsule [id number] Yes Take 1 capsule (50 mcg) by mouth once daily. Historical Provider, MD Taking Active   fluticasone (Flonase) 50 mcg/actuation nasal spray [id number] Yes once every 24 hours. Historical Provider, MD Taking Active   gabapentin (Neurontin) 100 mg capsule [id number] Yes Take 1 capsule (100 mg) by mouth 3 times a day. Historical Provider, MD Taking Active   HYDROcodone-acetaminophen (Norco) 5-325 mg tablet [id number] Yes once every 24 hours. Historical Provider, MD Taking Active   levothyroxine (Synthroid, Levoxyl) 75 mcg tablet [id number] Yes TAKE 1 TABLET BY MOUTH EVERY DAY BEFORE BREAKFAST Oral for 90 days Ilana Puri,  Taking Active   lisinopril 20 mg tablet [id number] Yes Take 1 tablet (20 mg) by mouth once daily. Ilana Puri DO Taking Active   multivitamin with minerals iron-free (Complete MV Adult 50 Plus) [id number] Yes as directed Orally Historical MD Sangeeta Taking Active   nystatin (Mycostatin) 100,000 unit/gram powder [id number] Yes 1 application to affected area Externally Twice a day prn for 90 days Hernanedz Rutherford MD Taking Active   omega 3-dha-epa-fish oil (Fish OiL) 1,200 (144-216) mg capsule [id number] Yes 1 capsule (1,200 mg) once every 24 hours. Hernandez Rutherford MD Taking Active   pravastatin (Pravachol) 10 mg tablet [id number] Yes Take 1 tablet (10 mg) by mouth once daily at bedtime. Ilana Puri DO Taking Active   SUMAtriptan (Imitrex) 20 mg/actuation nasal spray [id number] Yes USE 1 SPRAY NASALLY. MAY REPEAT IN 2 HOURS AS NEEDED. MAXIMUM 2 SPRAYS IN 24 HOURS Ilana Puri DO Taking Active   Symbicort 160-4.5  mcg/actuation inhaler [id number] Yes Inhale. Historical Provider, MD Taking Active   turmeric root extract 500 mg capsule [id number] Yes as directed Orally Historical Provider, MD Taking Active   zinc gluconate 50 mg tablet [id number] Yes Take by mouth. Historical Provider, MD Taking Active                    Allergies   Allergen Reactions    Simvastatin Myalgia    Topiramate Itching and Rash       Review of Systems   Constitutional:  Negative for chills, fatigue and fever.   Respiratory:  Negative for shortness of breath and wheezing.    Cardiovascular:  Negative for chest pain and leg swelling.   Musculoskeletal:  Positive for arthralgias.   All other systems reviewed and are negative.    Exam   On exam of the left shoulder, there is good elevation of the shoulder to 130 degrees, markedly positive impingement sign, minimal bicipital tenderness, good deltoid function, good pulses, good sensation    Assessment   Encounter Diagnoses   Name Primary?    Tear of left rotator cuff, unspecified tear extent, unspecified whether traumatic Yes    Impingement of left shoulder        Plan   Today we discussed conservative treatment. At this point, the patient is experiencing increased left shoulder pain that is consistent with a rotator cuff tear. We will do one cortisone injection into the left subacromial space in hopes to calm their symptoms nicely. Pt understands the small risk of infection and warning signs including flare reaction. Patient will follow up in four weeks, if needed.    Patient ID: Monica Durham is a 63 y.o. female.    L Inj/Asp: L subacromial bursa on 2/19/2024 2:19 PM  Indications: pain  Details: 22 G needle, ultrasound-guided  Medications: 40 mg triamcinolone acetonide 40 mg/mL; 0.5 mL lidocaine 10 mg/mL (1 %)  Outcome: tolerated well, no immediate complications    After discussing the risks and benefits of the procedure with proceeded with an injection.  Using ultrasound guidance we identified the  acromion, humeral head and the subacromial bursa, images obtained. We then sterilely injected the left subacrominal space with a mixture of 40 mg of Kenalog and 1 cc of 1 % lidocaine. Pt tolerated the procedure well without any adverse reactions.    Procedure, treatment alternatives, risks and benefits explained, specific risks discussed. Consent was given by the patient. Immediately prior to procedure a time out was called to verify the correct patient, procedure, equipment, support staff and site/side marked as required. Patient was prepped and draped in the usual sterile fashion.           I, Aleta Roberson, attest that this documentation has been prepared under the direction and in the presence of Mayito Mercedes MD. By signing below, I, Mayito Mercedes MD, personally performed the services described in this documentation. All medical record entries made by the scribe were at my direction and in my presence. I have reviewed the chart and agree that the record reflects my personal performance and is accurate and complete. 02/19/24

## 2024-04-29 ENCOUNTER — OFFICE VISIT (OUTPATIENT)
Dept: ORTHOPEDIC SURGERY | Facility: CLINIC | Age: 64
End: 2024-04-29
Payer: COMMERCIAL

## 2024-04-29 DIAGNOSIS — M25.812 IMPINGEMENT OF LEFT SHOULDER: Primary | ICD-10-CM

## 2024-04-29 DIAGNOSIS — M75.102 TEAR OF LEFT ROTATOR CUFF, UNSPECIFIED TEAR EXTENT, UNSPECIFIED WHETHER TRAUMATIC: ICD-10-CM

## 2024-04-29 PROCEDURE — 1036F TOBACCO NON-USER: CPT | Performed by: ORTHOPAEDIC SURGERY

## 2024-04-29 PROCEDURE — 20611 DRAIN/INJ JOINT/BURSA W/US: CPT | Performed by: ORTHOPAEDIC SURGERY

## 2024-04-29 PROCEDURE — 99213 OFFICE O/P EST LOW 20 MIN: CPT | Performed by: ORTHOPAEDIC SURGERY

## 2024-04-29 RX ORDER — TRIAMCINOLONE ACETONIDE 40 MG/ML
40 INJECTION, SUSPENSION INTRA-ARTICULAR; INTRAMUSCULAR
Status: COMPLETED | OUTPATIENT
Start: 2024-04-29 | End: 2024-04-29

## 2024-04-29 RX ORDER — LIDOCAINE HYDROCHLORIDE 10 MG/ML
3 INJECTION INFILTRATION; PERINEURAL
Status: COMPLETED | OUTPATIENT
Start: 2024-04-29 | End: 2024-04-29

## 2024-04-29 RX ADMIN — LIDOCAINE HYDROCHLORIDE 3 ML: 10 INJECTION INFILTRATION; PERINEURAL at 08:48

## 2024-04-29 RX ADMIN — TRIAMCINOLONE ACETONIDE 40 MG: 40 INJECTION, SUSPENSION INTRA-ARTICULAR; INTRAMUSCULAR at 08:48

## 2024-04-29 ASSESSMENT — PAIN - FUNCTIONAL ASSESSMENT: PAIN_FUNCTIONAL_ASSESSMENT: 0-10

## 2024-04-29 ASSESSMENT — ENCOUNTER SYMPTOMS
FEVER: 0
SHORTNESS OF BREATH: 0
ARTHRALGIAS: 1
WHEEZING: 0
CHILLS: 0
FATIGUE: 0

## 2024-04-29 ASSESSMENT — PAIN SCALES - GENERAL: PAINLEVEL_OUTOF10: 5 - MODERATE PAIN

## 2024-04-29 NOTE — PROGRESS NOTES
Reason for Appointment  Chief Complaint   Patient presents with    Left Shoulder - Follow-up     History of Present Illness  Patient is a 63 y.o. female here today for follow-up evaluation of her left shoulder 10 weeks s/p a left subacromial injection. Most recent X-rays show only very mild arthritic change. The injection provided good relief that has since worn off. Today, she complains of lateral shoulder pain that bothers her at night. No other updates to PMH, allergies, or medications.     Past Medical History:   Diagnosis Date    Acute maxillary sinusitis 2019    Back strain 2023    Non Hodgkin's lymphoma (Multi)     Sacral fracture, closed (Multi) 2023    Skin cancer     Tear of right rotator cuff 2023       Past Surgical History:   Procedure Laterality Date    HYSTERECTOMY      OTHER SURGICAL HISTORY  2022     section    OTHER SURGICAL HISTORY  2022    Hysterectomy       Medication Documentation Review Audit       Reviewed by Marissa Prieto MA (Medical Assistant) on 24 at 0838      Medication Order Taking? Sig Documenting Provider Last Dose Status   albuterol 2.5 mg /3 mL (0.083 %) nebulizer solution [id number] Yes 3 MILLILITER(S) EVERY 4 HOURS AS NEEDED INHALATION Historical Provider, MD Taking Active   albuterol 90 mcg/actuation inhaler [id number] Yes 2 puffs every 4 hours. Historical Provider, MD Taking Active   ascorbic acid (Vitamin C) 500 mg tablet [id number] Yes Take by mouth. Historical Provider, MD Taking Active   aspirin 81 mg EC tablet [id number] Yes once every 24 hours. Historical Provider, MD Taking Active   azithromycin (Zithromax) 250 mg tablet [id number] Yes Take by mouth once daily. Historical Provider, MD Taking Active   beclomethasone HFA (Qvar RediHaler) 40 mcg/actuation inhaler [id number] Yes 2 puff(s), Inhale, bid, # 1 EA, 3 Refill(s), JASIEL, Type: Maintenance, Pharmacy: [facility]/pharmacy #[id number] Historical Provider, MD Taking Active   budesonide  (Pulmicort) 0.5 mg/2 mL nebulizer solution [id number] Yes USE 2 ML DAILY IN SINUS RINSE Historical Provider, MD Taking Active   buPROPion XL (Wellbutrin XL) 150 mg 24 hr tablet [id number] Yes Take by mouth. Historical Provider, MD Taking Active   cholecalciferol (Vitamin D-3) 50 mcg (2,000 unit) capsule [id number] Yes Take 1 capsule (50 mcg) by mouth once daily. Historical Provider, MD Taking Active   fluticasone (Flonase) 50 mcg/actuation nasal spray [id number] Yes once every 24 hours. Historical Provider, MD Taking Active   gabapentin (Neurontin) 100 mg capsule [id number] Yes Take 1 capsule (100 mg) by mouth 3 times a day. Historical Provider, MD Taking Active   HYDROcodone-acetaminophen (Norco) 5-325 mg tablet [id number] Yes once every 24 hours. Historical Provider, MD Taking Active   levothyroxine (Synthroid, Levoxyl) 75 mcg tablet [id number] Yes TAKE 1 TABLET BY MOUTH EVERY DAY BEFORE BREAKFAST Oral for 90 days Ilana Puri DO Taking Active   lisinopril 20 mg tablet [id number] Yes Take 1 tablet (20 mg) by mouth once daily. Ilana Puri, DO Taking Active   multivitamin with minerals iron-free (Complete MV Adult 50 Plus) [id number] Yes as directed Orally Historical Provider, MD Taking Active   nystatin (Mycostatin) 100,000 unit/gram powder [id number] Yes 1 application to affected area Externally Twice a day prn for 90 days Historical MD Sangeeta Taking Active   omega 3-dha-epa-fish oil (Fish OiL) 1,200 (144-216) mg capsule [id number] Yes 1 capsule (1,200 mg) once every 24 hours. Historical Provider, MD Taking Active   pravastatin (Pravachol) 10 mg tablet [id number] Yes Take 1 tablet (10 mg) by mouth once daily at bedtime. Ilana Puri DO Taking Active   SUMAtriptan (Imitrex) 20 mg/actuation nasal spray [id number] Yes USE 1 SPRAY NASALLY. MAY REPEAT IN 2 HOURS AS NEEDED. MAXIMUM 2 SPRAYS IN 24 HOURS Ilana Puri DO Taking Active   Symbicort 160-4.5 mcg/actuation inhaler [id number] Yes Inhale. Historical  Provider, MD Taking Active   turmeric root extract 500 mg capsule [id number] Yes as directed Orally Historical Provider, MD Taking Active   zinc gluconate 50 mg tablet [id number] Yes Take by mouth. Historical Provider, MD Taking Active                    Allergies   Allergen Reactions    Simvastatin Myalgia    Topiramate Itching and Rash       Review of Systems   Constitutional:  Negative for chills, fatigue and fever.   Respiratory:  Negative for shortness of breath and wheezing.    Cardiovascular:  Negative for chest pain and leg swelling.   Musculoskeletal:  Positive for arthralgias.   All other systems reviewed and are negative.      Exam   On exam of the left arm, there is good forward flexion, pain and mild weakness with external rotation, positive impingement sign, mild biceps tenderness, no significant joint line tenderness, no AC joint tenderness, good pulses, good sensation    Assessment   Encounter Diagnoses   Name Primary?    Tear of left rotator cuff, unspecified tear extent, unspecified whether traumatic     Impingement of left shoulder Yes       Plan   She has partial thickness tearing of her rotator cuff seen on previous MRI and her bursa is still inflamed after one injection. Today, we did discuss arthroscopic clean out of the shoulder. Long term, there is a high likelihood that she will need a reverse shoulder replacement. It has only been 10 weeks since her last injection, and she understands the risks of further cuff deterioration with repeated injections.     Today we discussed conservative treatment. At this point, the patient is experiencing increased left shoulder pain that is consistent with impingement syndrome on clinical examination. We will do one cortisone injection into the left subacromial space in hopes to calm their symptoms nicely. Pt understands the small risk of infection and warning signs including flare reaction. Patient will follow up in four weeks, if needed.    Patient ID:  Monica Durham is a 63 y.o. female.    L Inj/Asp: L subacromial bursa on 4/29/2024 8:48 AM  Indications: pain  Details: 22 G needle, ultrasound-guided  Medications: 40 mg triamcinolone acetonide 40 mg/mL; 3 mL lidocaine 10 mg/mL (1 %)  Outcome: tolerated well, no immediate complications    After discussing the risks and benefits of the procedure with proceeded with an injection.  Using ultrasound guidance we identified the acromion, humeral head and the subacromial bursa, images obtained. We then sterilely injected the left subacrominal space with a mixture of 40 mg of Kenalog and 1 cc of 1 % lidocaine. Pt tolerated the procedure well without any adverse reactions.    Procedure, treatment alternatives, risks and benefits explained, specific risks discussed. Consent was given by the patient. Immediately prior to procedure a time out was called to verify the correct patient, procedure, equipment, support staff and site/side marked as required. Patient was prepped and draped in the usual sterile fashion.           I, Aleta Roberson, attest that this documentation has been prepared under the direction and in the presence of Mayito Mercedes MD. By signing below, IMayito MD, personally performed the services described in this documentation. All medical record entries made by the scribe were at my direction and in my presence. I have reviewed the chart and agree that the record reflects my personal performance and is accurate and complete. 04/29/24

## 2024-05-08 ENCOUNTER — OFFICE VISIT (OUTPATIENT)
Dept: ORTHOPEDIC SURGERY | Facility: CLINIC | Age: 64
End: 2024-05-08
Payer: COMMERCIAL

## 2024-05-08 ENCOUNTER — HOSPITAL ENCOUNTER (OUTPATIENT)
Dept: RADIOLOGY | Facility: CLINIC | Age: 64
Discharge: HOME | End: 2024-05-08
Payer: COMMERCIAL

## 2024-05-08 DIAGNOSIS — M25.551 RIGHT HIP PAIN: ICD-10-CM

## 2024-05-08 DIAGNOSIS — M25.551 RIGHT HIP PAIN: Primary | ICD-10-CM

## 2024-05-08 PROCEDURE — 99214 OFFICE O/P EST MOD 30 MIN: CPT | Performed by: ORTHOPAEDIC SURGERY

## 2024-05-08 PROCEDURE — 73502 X-RAY EXAM HIP UNI 2-3 VIEWS: CPT | Mod: RT

## 2024-05-08 PROCEDURE — 73502 X-RAY EXAM HIP UNI 2-3 VIEWS: CPT | Mod: RIGHT SIDE | Performed by: STUDENT IN AN ORGANIZED HEALTH CARE EDUCATION/TRAINING PROGRAM

## 2024-05-08 PROCEDURE — 1036F TOBACCO NON-USER: CPT | Performed by: ORTHOPAEDIC SURGERY

## 2024-05-08 NOTE — PROGRESS NOTES
This is a consultation from Dr. Ilana Puri DO for   Chief Complaint   Patient presents with    Right Hip - Pain       This is a 63 y.o. female who presents for evaluation of right hip pain.  Patient had right hip pain for nearly a year, this is a chronic issue but is been getting worse.  It has been exacerbated recently, sharp stabbing pain in the anterior hip and groin.  No pain going down the leg numbness or tingling.  She is never had injections into the hip joint, she has had injections in the bursa on the side which were not helpful.  She has tried over-the-counter anti-inflammatories.  No numbness or tingling no fevers or chills no shooting pain down the leg.    Physical Exam    There has been no interval change in this patient's past medical, surgical, medications, allergies, family history or social history since the most recent visit to a provider within our department. 14 point review of systems was performed, reviewed, and negative except for pertinent positives documented in the history of present illness.     Constitutional: well developed, well nourished female in no acute distress  Psychiatric: normal mood, appropriate affect  Eyes: sclera anicteric  HENT: normocephalic/atraumatic  CV: regular rate and rhythm   Respiratory: non labored breathing  Integumentary: no rash  Neurological: moves all extremities    Right hip exam: skin normal, no abrasions, wounds, or lacerations. nttp over greater trochanter. negative log roll, negative makayla's test. flexion to 90 degrees without pain. no pain with flexion abduction and external rotation, reproduction of pain with flexion adduction and internal rotation. neurovascularly intact distally        Xrays were ordered by me, they were reviewed and independently interpreted by me today, they show mild to moderate degenerative changes right hip    Procedures      Impression/Plan: This is a 63 y.o. female mild to moderate right hip arthritis.  I had an in  "depth discussion with the patient regarding treatment options for arthritis and their relative risks and benefits. We reviewed surgical and nonsurgical option for treatment. Treatments include anti inflammatory medications, physical therapy, weight loss, activity modification, use of assistive devices, injection therapies. We discussed current prescriptions and risks and benefits of continuation of prescription medication as apporpriate. We discussed that arthritis is often progressive over time, an in end stage arthritis surgical interventions can be considered, including arthroplasty. All questions were answered and the patient voiced their understanding.  Will get her set up with hydrocortisone injection, I will see her back.    BMI Readings from Last 1 Encounters:   02/10/24 35.96 kg/m²      Lab Results   Component Value Date    CREATININE 0.90 12/04/2023     Tobacco Use: Medium Risk (5/8/2024)    Patient History     Smoking Tobacco Use: Former     Smokeless Tobacco Use: Never     Passive Exposure: Not on file      Computed MELD 3.0 unavailable. One or more values for this score either were not found within the given timeframe or did not fit some other criterion.  Computed MELD-Na unavailable. One or more values for this score either were not found within the given timeframe or did not fit some other criterion.       Lab Results   Component Value Date    HGBA1C 5.2 11/29/2022     No results found for: \"STAPHMRSASCR\"  "

## 2024-05-08 NOTE — LETTER
May 8, 2024     Ilana Puri DO  [address] [place] [address]  [facility]  [address]  [place] [address]    Patient: Monica Durham   YOB: 1960   Date of Visit: 5/8/2024       Dear Dr. Ilana Puri DO:    Thank you for referring Monica Durham to me for evaluation. Below are my notes for this consultation.  If you have questions, please do not hesitate to call me. I look forward to following your patient along with you.       Sincerely,     Yvan Leary MD      CC: No Recipients  ______________________________________________________________________________________    This is a consultation from Dr. Ilana Puri DO for   Chief Complaint   Patient presents with   • Right Hip - Pain       This is a 63 y.o. female who presents for evaluation of right hip pain.  Patient had right hip pain for nearly a year, this is a chronic issue but is been getting worse.  It has been exacerbated recently, sharp stabbing pain in the anterior hip and groin.  No pain going down the leg numbness or tingling.  She is never had injections into the hip joint, she has had injections in the bursa on the side which were not helpful.  She has tried over-the-counter anti-inflammatories.  No numbness or tingling no fevers or chills no shooting pain down the leg.    Physical Exam    There has been no interval change in this patient's past medical, surgical, medications, allergies, family history or social history since the most recent visit to a provider within our department. 14 point review of systems was performed, reviewed, and negative except for pertinent positives documented in the history of present illness.     Constitutional: well developed, well nourished female in no acute distress  Psychiatric: normal mood, appropriate affect  Eyes: sclera anicteric  HENT: normocephalic/atraumatic  CV: regular rate and rhythm   Respiratory: non labored breathing  Integumentary: no rash  Neurological: moves all  extremities    Right hip exam: skin normal, no abrasions, wounds, or lacerations. nttp over greater trochanter. negative log roll, negative makayla's test. flexion to 90 degrees without pain. no pain with flexion abduction and external rotation, reproduction of pain with flexion adduction and internal rotation. neurovascularly intact distally        Xrays were ordered by me, they were reviewed and independently interpreted by me today, they show mild to moderate degenerative changes right hip    Procedures      Impression/Plan: This is a 63 y.o. female mild to moderate right hip arthritis.  I had an in depth discussion with the patient regarding treatment options for arthritis and their relative risks and benefits. We reviewed surgical and nonsurgical option for treatment. Treatments include anti inflammatory medications, physical therapy, weight loss, activity modification, use of assistive devices, injection therapies. We discussed current prescriptions and risks and benefits of continuation of prescription medication as apporpriate. We discussed that arthritis is often progressive over time, an in end stage arthritis surgical interventions can be considered, including arthroplasty. All questions were answered and the patient voiced their understanding.  Will get her set up with hydrocortisone injection, I will see her back.    BMI Readings from Last 1 Encounters:   02/10/24 35.96 kg/m²      Lab Results   Component Value Date    CREATININE 0.90 12/04/2023     Tobacco Use: Medium Risk (5/8/2024)    Patient History    • Smoking Tobacco Use: Former    • Smokeless Tobacco Use: Never    • Passive Exposure: Not on file      Computed MELD 3.0 unavailable. One or more values for this score either were not found within the given timeframe or did not fit some other criterion.  Computed MELD-Na unavailable. One or more values for this score either were not found within the given timeframe or did not fit some other  "criterion.       Lab Results   Component Value Date    HGBA1C 5.2 11/29/2022     No results found for: \"STAPHMRSASCR\"  "

## 2024-05-21 ENCOUNTER — APPOINTMENT (OUTPATIENT)
Dept: RADIOLOGY | Facility: HOSPITAL | Age: 64
End: 2024-05-21
Payer: COMMERCIAL

## 2024-05-23 ENCOUNTER — HOSPITAL ENCOUNTER (OUTPATIENT)
Dept: RADIOLOGY | Facility: HOSPITAL | Age: 64
Discharge: HOME | End: 2024-05-23
Payer: COMMERCIAL

## 2024-05-23 VITALS
HEART RATE: 70 BPM | DIASTOLIC BLOOD PRESSURE: 98 MMHG | RESPIRATION RATE: 18 BRPM | SYSTOLIC BLOOD PRESSURE: 154 MMHG | OXYGEN SATURATION: 100 %

## 2024-05-23 DIAGNOSIS — M25.551 RIGHT HIP PAIN: ICD-10-CM

## 2024-05-23 PROCEDURE — 20610 DRAIN/INJ JOINT/BURSA W/O US: CPT | Mod: RIGHT SIDE | Performed by: STUDENT IN AN ORGANIZED HEALTH CARE EDUCATION/TRAINING PROGRAM

## 2024-05-23 PROCEDURE — 2550000001 HC RX 255 CONTRASTS: Performed by: ORTHOPAEDIC SURGERY

## 2024-05-23 PROCEDURE — 77002 NEEDLE LOCALIZATION BY XRAY: CPT | Mod: RT

## 2024-05-23 PROCEDURE — 77002 NEEDLE LOCALIZATION BY XRAY: CPT | Mod: RIGHT SIDE | Performed by: STUDENT IN AN ORGANIZED HEALTH CARE EDUCATION/TRAINING PROGRAM

## 2024-05-23 PROCEDURE — 2500000004 HC RX 250 GENERAL PHARMACY W/ HCPCS (ALT 636 FOR OP/ED): Performed by: STUDENT IN AN ORGANIZED HEALTH CARE EDUCATION/TRAINING PROGRAM

## 2024-05-23 RX ORDER — BUPIVACAINE HYDROCHLORIDE 2.5 MG/ML
INJECTION, SOLUTION EPIDURAL; INFILTRATION; INTRACAUDAL
Status: DISPENSED
Start: 2024-05-23 | End: 2024-05-23

## 2024-05-23 RX ORDER — TRIAMCINOLONE ACETONIDE 40 MG/ML
INJECTION, SUSPENSION INTRA-ARTICULAR; INTRAMUSCULAR
Status: DISPENSED
Start: 2024-05-23 | End: 2024-05-23

## 2024-05-23 RX ORDER — TRIAMCINOLONE ACETONIDE 40 MG/ML
INJECTION, SUSPENSION INTRA-ARTICULAR; INTRAMUSCULAR
Status: COMPLETED | OUTPATIENT
Start: 2024-05-23 | End: 2024-05-23

## 2024-05-23 RX ORDER — BUPIVACAINE HYDROCHLORIDE 2.5 MG/ML
INJECTION, SOLUTION EPIDURAL; INFILTRATION; INTRACAUDAL
Status: COMPLETED | OUTPATIENT
Start: 2024-05-23 | End: 2024-05-23

## 2024-05-23 RX ORDER — LIDOCAINE HYDROCHLORIDE 10 MG/ML
5 INJECTION, SOLUTION EPIDURAL; INFILTRATION; INTRACAUDAL; PERINEURAL ONCE
Status: CANCELLED | OUTPATIENT
Start: 2024-05-23 | End: 2024-05-23

## 2024-05-23 RX ADMIN — BUPIVACAINE HYDROCHLORIDE 10 ML: 2.5 INJECTION, SOLUTION EPIDURAL; INFILTRATION; INTRACAUDAL; PERINEURAL at 11:45

## 2024-05-23 RX ADMIN — IOHEXOL 1 ML: 240 INJECTION, SOLUTION INTRATHECAL; INTRAVASCULAR; INTRAVENOUS; ORAL at 12:09

## 2024-05-23 RX ADMIN — TRIAMCINOLONE ACETONIDE 40 MG: 40 INJECTION, SUSPENSION INTRA-ARTICULAR; INTRAMUSCULAR at 11:46

## 2024-05-23 ASSESSMENT — PAIN SCALES - GENERAL: PAINLEVEL_OUTOF10: 0 - NO PAIN

## 2024-05-23 NOTE — PRE-PROCEDURE NOTE
Interventional Radiology Preprocedure Note    Indication for procedure: The encounter diagnosis was Right hip pain.    Relevant review of systems: NA    Relevant Labs:   Lab Results   Component Value Date    CREATININE 0.90 12/04/2023    EGFR 72 12/04/2023       Planned Sedation/Anesthesia: Local    Airway assessment:  N/A    Directed physical examination:    N/A    Mallampati:  N/A    ASA Score: ASA 1 - Normal health patient    Benefits, risks and alternatives of procedure and planned sedation have been discussed with the patient and/or their representative. All questions answered and they agree to proceed.

## 2024-05-23 NOTE — POST-PROCEDURE NOTE
Interventional Radiology Brief Postprocedure Note    Attending: Reema Bill MD      Assistant: none    Diagnosis: pain    Description of procedure: The  [right] hip was placed in internal rotation. Skin was marked a the chosen site of access to the hip joint. The skin was prepared and draped in a sterile fashion. Local anesthesia was achieved using 1% lidocaine. A 20 gauge spinal needle was advanced into the  [right] hip joint under fluoroscopic guidance. Less than 1 mL of iodinated contrast was injected to confirm joint access. Subsequently, [1] mL of 40 mg/mL Kenalog, 3 mL of 0.25% bupivacaine, and 3 mL of 1% lidocaine were injected in to the [right] hip joint. Needle was removed and hemostasis was achieved. No immediate complication was evident.     Anesthesia:  Local    Complications: None    Estimated Blood Loss: none    Medications (Filter: Administrations occurring from 1137 to 1740 on 05/23/24) As of 05/23/24 1740      bupivacaine PF (Marcaine) 0.25 % (2.5 mg/mL) injection (mL) Total volume:  10 mL      Date/Time Rate/Dose/Volume Action       05/23/24  1145 10 mL Given               triamcinolone acetonide (Kenalog-40) injection (mg) Total dose:  40 mg      Date/Time Rate/Dose/Volume Action       05/23/24  1146 40 mg Given               iohexol (OMNIPaque) 240 mg iodine/mL solution 1 mL (mL) Total volume:  1 mL      Date/Time Rate/Dose/Volume Action       05/23/24  1209 1 mL Given                   No specimens collected      See detailed result report with images in PACS.    The patient tolerated the procedure well without incident or complication and is in stable condition.

## 2024-05-26 DIAGNOSIS — G43.009 MIGRAINE WITHOUT AURA AND WITHOUT STATUS MIGRAINOSUS, NOT INTRACTABLE: ICD-10-CM

## 2024-05-28 RX ORDER — ESTRADIOL 1 MG/1
TABLET ORAL EVERY 24 HOURS
COMMUNITY

## 2024-05-28 RX ORDER — FLUTICASONE FUROATE AND VILANTEROL TRIFENATATE 100; 25 UG/1; UG/1
POWDER RESPIRATORY (INHALATION)
COMMUNITY
Start: 2024-05-24

## 2024-05-28 RX ORDER — METHOCARBAMOL 500 MG/1
TABLET, FILM COATED ORAL
COMMUNITY
Start: 2023-06-06

## 2024-05-29 RX ORDER — SUMATRIPTAN 20 MG/1
SPRAY NASAL
Qty: 18 EACH | Refills: 0 | Status: SHIPPED | OUTPATIENT
Start: 2024-05-29

## 2024-06-12 ENCOUNTER — TELEPHONE (OUTPATIENT)
Dept: PRIMARY CARE | Facility: CLINIC | Age: 64
End: 2024-06-12

## 2024-06-12 ENCOUNTER — APPOINTMENT (OUTPATIENT)
Dept: PRIMARY CARE | Facility: CLINIC | Age: 64
End: 2024-06-12
Payer: COMMERCIAL

## 2024-06-12 VITALS
HEART RATE: 72 BPM | BODY MASS INDEX: 33.84 KG/M2 | HEIGHT: 63 IN | DIASTOLIC BLOOD PRESSURE: 82 MMHG | WEIGHT: 191 LBS | OXYGEN SATURATION: 97 % | SYSTOLIC BLOOD PRESSURE: 130 MMHG

## 2024-06-12 DIAGNOSIS — Z00.00 WELL ADULT EXAM: ICD-10-CM

## 2024-06-12 DIAGNOSIS — I10 ESSENTIAL HYPERTENSION: Primary | ICD-10-CM

## 2024-06-12 DIAGNOSIS — E03.9 HYPOTHYROIDISM, UNSPECIFIED TYPE: ICD-10-CM

## 2024-06-12 DIAGNOSIS — E78.00 HYPERCHOLESTEROLEMIA: ICD-10-CM

## 2024-06-12 DIAGNOSIS — F33.42 RECURRENT MAJOR DEPRESSION IN FULL REMISSION (CMS-HCC): ICD-10-CM

## 2024-06-12 DIAGNOSIS — R73.01 IFG (IMPAIRED FASTING GLUCOSE): ICD-10-CM

## 2024-06-12 DIAGNOSIS — I10 ESSENTIAL HYPERTENSION: ICD-10-CM

## 2024-06-12 PROBLEM — M54.16 LUMBAR RADICULOPATHY: Status: ACTIVE | Noted: 2024-06-12

## 2024-06-12 PROCEDURE — 99214 OFFICE O/P EST MOD 30 MIN: CPT | Performed by: FAMILY MEDICINE

## 2024-06-12 PROCEDURE — 3079F DIAST BP 80-89 MM HG: CPT | Performed by: FAMILY MEDICINE

## 2024-06-12 PROCEDURE — 1036F TOBACCO NON-USER: CPT | Performed by: FAMILY MEDICINE

## 2024-06-12 PROCEDURE — 3075F SYST BP GE 130 - 139MM HG: CPT | Performed by: FAMILY MEDICINE

## 2024-06-12 RX ORDER — LISINOPRIL 20 MG/1
20 TABLET ORAL 2 TIMES DAILY
Qty: 180 TABLET | Refills: 1 | Status: SHIPPED | OUTPATIENT
Start: 2024-06-12 | End: 2024-12-09

## 2024-06-12 ASSESSMENT — PAIN SCALES - GENERAL: PAINLEVEL: 0-NO PAIN

## 2024-06-12 ASSESSMENT — PATIENT HEALTH QUESTIONNAIRE - PHQ9
SUM OF ALL RESPONSES TO PHQ9 QUESTIONS 1 AND 2: 0
2. FEELING DOWN, DEPRESSED OR HOPELESS: NOT AT ALL
1. LITTLE INTEREST OR PLEASURE IN DOING THINGS: NOT AT ALL

## 2024-06-12 ASSESSMENT — ENCOUNTER SYMPTOMS
RESPIRATORY NEGATIVE: 1
CARDIOVASCULAR NEGATIVE: 1
ROS SKIN COMMENTS: POSITIVE FOR BRUISING.

## 2024-06-12 NOTE — PROGRESS NOTES
"Subjective   Patient ID: Monica Durham is a 63 y.o. female who presents for Hypertension.    1) HTN: controlled, compliant with lisinopril but is taking it twice a day as recommended by her daughter who is a pharmacist since 6/2, diastolic blood pressure has remained below 100, tries to follow a low salt diet, exercises at least 5-6 days a week (aerobics, yoga, and yard work).  2) Depression: controlled, weaned off of Wellbutrin months ago, currently weaning off of gabapentin, will stop seeing psychiatry soon, depression is seasonal and would like to be treated here during the winter.    Review of Systems   Respiratory: Negative.     Cardiovascular: Negative.    Skin:         Positive for bruising.     Objective   /82 (BP Location: Left arm, Patient Position: Sitting)   Pulse 72   Ht 1.6 m (5' 3\")   Wt 86.6 kg (191 lb)   SpO2 97%   BMI 33.83 kg/m²     Physical Exam  Vitals and nursing note reviewed.   Constitutional:       Appearance: Normal appearance. She is obese.   Cardiovascular:      Rate and Rhythm: Normal rate and regular rhythm.   Pulmonary:      Effort: Pulmonary effort is normal.      Breath sounds: Normal breath sounds.   Neurological:      Mental Status: She is alert.   Psychiatric:         Mood and Affect: Mood normal.         Behavior: Behavior normal.     Assessment/Plan   Diagnoses and all orders for this visit:  Essential hypertension  Controlled.  Lisinopril 20 mg tablet refilled.  Continue to check blood pressure at home.  Low salt diet.  Regular exercise.  Manage weight.  Follow up in 6 months for CPE.    Recurrent major depression in full remission (CMS-HCC)  Controlled.  Weaning off of gabapentin.  Patient will stop seeing psychiatry when she is no longer taking gabapentin.  Patient understands that I will treat with only one medication.  Multiple medications will require a psych referral.   Follow up in 6 months for CPE.    Other orders  -     Follow Up In Primary Care - " Established  -     Follow Up In Primary Care - Health Maintenance; Future       Secondary Defect Length In Cm (Required For Flaps): 3.7

## 2024-06-20 DIAGNOSIS — G43.009 MIGRAINE WITHOUT AURA AND WITHOUT STATUS MIGRAINOSUS, NOT INTRACTABLE: ICD-10-CM

## 2024-06-21 RX ORDER — SUMATRIPTAN 20 MG/1
SPRAY NASAL
Qty: 18 EACH | Refills: 3 | Status: SHIPPED | OUTPATIENT
Start: 2024-06-21

## 2024-06-27 ENCOUNTER — LAB (OUTPATIENT)
Dept: LAB | Facility: LAB | Age: 64
End: 2024-06-27
Payer: COMMERCIAL

## 2024-06-27 ENCOUNTER — OFFICE VISIT (OUTPATIENT)
Dept: PRIMARY CARE | Facility: CLINIC | Age: 64
End: 2024-06-27
Payer: COMMERCIAL

## 2024-06-27 VITALS
TEMPERATURE: 97.8 F | OXYGEN SATURATION: 97 % | DIASTOLIC BLOOD PRESSURE: 85 MMHG | BODY MASS INDEX: 34.38 KG/M2 | HEART RATE: 73 BPM | WEIGHT: 194 LBS | HEIGHT: 63 IN | SYSTOLIC BLOOD PRESSURE: 149 MMHG

## 2024-06-27 DIAGNOSIS — I10 ESSENTIAL HYPERTENSION: ICD-10-CM

## 2024-06-27 DIAGNOSIS — I10 ESSENTIAL HYPERTENSION: Primary | ICD-10-CM

## 2024-06-27 LAB
ANION GAP SERPL CALC-SCNC: 11 MMOL/L
APPEARANCE UR: CLEAR
BILIRUB UR STRIP.AUTO-MCNC: NEGATIVE MG/DL
BUN SERPL-MCNC: 13 MG/DL (ref 8–25)
CALCIUM SERPL-MCNC: 9.8 MG/DL (ref 8.5–10.4)
CHLORIDE SERPL-SCNC: 105 MMOL/L (ref 97–107)
CO2 SERPL-SCNC: 25 MMOL/L (ref 24–31)
COLOR UR: COLORLESS
CREAT SERPL-MCNC: 0.9 MG/DL (ref 0.4–1.6)
EGFRCR SERPLBLD CKD-EPI 2021: 72 ML/MIN/1.73M*2
GLUCOSE SERPL-MCNC: 90 MG/DL (ref 65–99)
GLUCOSE UR STRIP.AUTO-MCNC: NORMAL MG/DL
KETONES UR STRIP.AUTO-MCNC: NEGATIVE MG/DL
LEUKOCYTE ESTERASE UR QL STRIP.AUTO: NEGATIVE
NITRITE UR QL STRIP.AUTO: NEGATIVE
PH UR STRIP.AUTO: 7 [PH]
POTASSIUM SERPL-SCNC: 4.1 MMOL/L (ref 3.4–5.1)
PROT UR STRIP.AUTO-MCNC: NEGATIVE MG/DL
RBC # UR STRIP.AUTO: NEGATIVE /UL
SODIUM SERPL-SCNC: 141 MMOL/L (ref 133–145)
SP GR UR STRIP.AUTO: 1.01
TSH SERPL DL<=0.05 MIU/L-ACNC: 1.15 MIU/L (ref 0.27–4.2)
UROBILINOGEN UR STRIP.AUTO-MCNC: NORMAL MG/DL

## 2024-06-27 PROCEDURE — 36415 COLL VENOUS BLD VENIPUNCTURE: CPT

## 2024-06-27 PROCEDURE — 3079F DIAST BP 80-89 MM HG: CPT | Performed by: FAMILY MEDICINE

## 2024-06-27 PROCEDURE — 3077F SYST BP >= 140 MM HG: CPT | Performed by: FAMILY MEDICINE

## 2024-06-27 PROCEDURE — 80048 BASIC METABOLIC PNL TOTAL CA: CPT

## 2024-06-27 PROCEDURE — 84443 ASSAY THYROID STIM HORMONE: CPT

## 2024-06-27 PROCEDURE — 81003 URINALYSIS AUTO W/O SCOPE: CPT

## 2024-06-27 PROCEDURE — 99213 OFFICE O/P EST LOW 20 MIN: CPT | Performed by: FAMILY MEDICINE

## 2024-06-27 RX ORDER — AMLODIPINE BESYLATE 5 MG/1
5 TABLET ORAL DAILY
Qty: 30 TABLET | Refills: 0 | Status: SHIPPED | OUTPATIENT
Start: 2024-06-27 | End: 2024-07-27

## 2024-06-27 ASSESSMENT — COLUMBIA-SUICIDE SEVERITY RATING SCALE - C-SSRS
6. HAVE YOU EVER DONE ANYTHING, STARTED TO DO ANYTHING, OR PREPARED TO DO ANYTHING TO END YOUR LIFE?: NO
2. HAVE YOU ACTUALLY HAD ANY THOUGHTS OF KILLING YOURSELF?: NO
1. IN THE PAST MONTH, HAVE YOU WISHED YOU WERE DEAD OR WISHED YOU COULD GO TO SLEEP AND NOT WAKE UP?: NO

## 2024-06-27 ASSESSMENT — PATIENT HEALTH QUESTIONNAIRE - PHQ9
1. LITTLE INTEREST OR PLEASURE IN DOING THINGS: NOT AT ALL
SUM OF ALL RESPONSES TO PHQ9 QUESTIONS 1 AND 2: 0
2. FEELING DOWN, DEPRESSED OR HOPELESS: NOT AT ALL

## 2024-06-27 ASSESSMENT — ENCOUNTER SYMPTOMS: HEADACHES: 1

## 2024-06-27 ASSESSMENT — PAIN SCALES - GENERAL: PAINLEVEL: 4

## 2024-06-27 NOTE — PROGRESS NOTES
"Subjective   Patient ID: Monica Durham is a 63 y.o. female who presents for Follow-up (On blood pressure its been running high consistently/Feels like she could see heartbeat through eyes and heart beating fast along with headaches).    HTN: uncontrolled, seen on 6/12 and at that time informed me that as of 6/2 she was taking lisinopril twice a day as recommended by her daughter who is a pharmacist, blood pressure began to increase on 6/17, on average the blood pressure has been in the 150s/100s, patient tried her 's atenolol 25 mg which lowered her blood pressure but not to goal, atenolol made her feel fatigued so the patient does not want to take it.    Review of Systems   Neurological:  Positive for headaches.     Objective   /85 (BP Location: Left arm, Patient Position: Sitting, BP Cuff Size: Adult)   Pulse 73   Temp 36.6 °C (97.8 °F) (Temporal)   Ht 1.6 m (5' 3\")   Wt 88 kg (194 lb)   SpO2 97%   BMI 34.37 kg/m²     Physical Exam  Vitals and nursing note reviewed.   Constitutional:       Appearance: Normal appearance. She is obese.   Cardiovascular:      Rate and Rhythm: Normal rate and regular rhythm.   Pulmonary:      Effort: Pulmonary effort is normal.      Breath sounds: Normal breath sounds.   Neurological:      Mental Status: She is alert.     Assessment/Plan   Diagnoses and all orders for this visit:  Essential hypertension  Uncontrolled.    Continue taking lisinopril.  AmLODIPine (Norvasc) 5 mg tablet prescribed.  Continue to check blood pressure at home.  Labs ordered.  Will notify with results.  Follow up in 1 month.   Other orders  -     Follow Up In Primary Care - Established; Future     "

## 2024-06-28 ENCOUNTER — APPOINTMENT (OUTPATIENT)
Dept: PRIMARY CARE | Facility: CLINIC | Age: 64
End: 2024-06-28
Payer: COMMERCIAL

## 2024-07-11 ENCOUNTER — TELEPHONE (OUTPATIENT)
Dept: PRIMARY CARE | Facility: CLINIC | Age: 64
End: 2024-07-11

## 2024-07-11 ENCOUNTER — APPOINTMENT (OUTPATIENT)
Dept: PRIMARY CARE | Facility: CLINIC | Age: 64
End: 2024-07-11
Payer: COMMERCIAL

## 2024-07-11 DIAGNOSIS — I10 ESSENTIAL HYPERTENSION: ICD-10-CM

## 2024-07-11 RX ORDER — AMLODIPINE BESYLATE 5 MG/1
5 TABLET ORAL DAILY
Qty: 30 TABLET | Refills: 0 | OUTPATIENT
Start: 2024-07-11 | End: 2024-08-10

## 2024-07-11 NOTE — TELEPHONE ENCOUNTER
Patient had appointment today that had to be rescheduled.  She needs refill of amlodipine 5 mg.  She only has 3 left.  Please send to [facility] in [place]

## 2024-07-16 ENCOUNTER — APPOINTMENT (OUTPATIENT)
Dept: PRIMARY CARE | Facility: CLINIC | Age: 64
End: 2024-07-16
Payer: COMMERCIAL

## 2024-07-16 VITALS
BODY MASS INDEX: 33.84 KG/M2 | HEIGHT: 63 IN | WEIGHT: 191 LBS | DIASTOLIC BLOOD PRESSURE: 84 MMHG | TEMPERATURE: 97.5 F | HEART RATE: 80 BPM | OXYGEN SATURATION: 94 % | SYSTOLIC BLOOD PRESSURE: 119 MMHG

## 2024-07-16 DIAGNOSIS — I10 ESSENTIAL HYPERTENSION: Primary | ICD-10-CM

## 2024-07-16 DIAGNOSIS — F33.0 MILD EPISODE OF RECURRENT MAJOR DEPRESSIVE DISORDER (CMS-HCC): ICD-10-CM

## 2024-07-16 PROCEDURE — 99214 OFFICE O/P EST MOD 30 MIN: CPT | Performed by: FAMILY MEDICINE

## 2024-07-16 PROCEDURE — 1036F TOBACCO NON-USER: CPT | Performed by: FAMILY MEDICINE

## 2024-07-16 PROCEDURE — 3074F SYST BP LT 130 MM HG: CPT | Performed by: FAMILY MEDICINE

## 2024-07-16 PROCEDURE — 3079F DIAST BP 80-89 MM HG: CPT | Performed by: FAMILY MEDICINE

## 2024-07-16 RX ORDER — AMLODIPINE BESYLATE 5 MG/1
5 TABLET ORAL DAILY
Qty: 90 TABLET | Refills: 1 | Status: SHIPPED | OUTPATIENT
Start: 2024-07-16 | End: 2025-01-12

## 2024-07-16 RX ORDER — BUPROPION HYDROCHLORIDE 150 MG/1
150 TABLET ORAL DAILY
COMMUNITY
End: 2024-07-16 | Stop reason: SDUPTHER

## 2024-07-16 RX ORDER — BUPROPION HYDROCHLORIDE 150 MG/1
150 TABLET ORAL DAILY
Qty: 90 TABLET | Refills: 1 | Status: SHIPPED | OUTPATIENT
Start: 2024-07-16 | End: 2025-01-12

## 2024-07-16 ASSESSMENT — ENCOUNTER SYMPTOMS
CARDIOVASCULAR NEGATIVE: 1
DYSPHORIC MOOD: 1
RESPIRATORY NEGATIVE: 1

## 2024-07-16 NOTE — PROGRESS NOTES
"Subjective   Patient ID: Monica Durham is a 63 y.o. female who presents for Blood Pressure Check (Has been feeling better and no more headaches ) and Med Refill (Wellbutrin 150 mg ER/BP medication if 3 months [facility] ).    1) HTN: seen one month ago, instructed to continue taking lisinopril, amlodipine 5 mg prescribed, denies side effects, checks blood pressure at home and average is in the normal range, needs refills.  2) Depression: uncontrolled, became depressed when she found out about the attempted assassination of Dg Isaac, resumed taking Wellbutrin and is feeling better, needs refill.      Review of Systems   Respiratory: Negative.     Cardiovascular: Negative.    Psychiatric/Behavioral:  Positive for dysphoric mood.      Objective   /84 (BP Location: Left arm, Patient Position: Sitting, BP Cuff Size: Adult)   Pulse 80   Temp 36.4 °C (97.5 °F) (Temporal)   Ht 1.6 m (5' 3\")   Wt 86.6 kg (191 lb)   SpO2 94%   BMI 33.83 kg/m²     Physical Exam  Vitals and nursing note reviewed.   Constitutional:       Appearance: Normal appearance. She is obese.   Cardiovascular:      Rate and Rhythm: Normal rate and regular rhythm.   Pulmonary:      Effort: Pulmonary effort is normal.      Breath sounds: Normal breath sounds.   Neurological:      Mental Status: She is alert.   Psychiatric:         Behavior: Behavior normal.      Comments: Mildly depressed mood.     Assessment/Plan   Diagnoses and all orders for this visit:  Essential hypertension  Controlled.  Continue with lisinopril.  AmLODIPine (Norvasc) 5 mg tablet refilled.  Continue to check blood pressure at home.  Low salt diet.  Regular exercise.  Manage weight.  Follow up in December for CPE.    Mild episode of recurrent major depressive disorder (CMS-HCC)  Recurrence.  BuPROPion XL (Wellbutrin XL) 150 mg 24 hr tablet refilled.  Monitor mood.  Follow up in December for CPE.    Other orders  -     Follow Up In Primary Care - " Established

## 2024-07-23 ENCOUNTER — TELEPHONE (OUTPATIENT)
Dept: ORTHOPEDIC SURGERY | Facility: CLINIC | Age: 64
End: 2024-07-23
Payer: COMMERCIAL

## 2024-08-02 ENCOUNTER — TELEPHONE (OUTPATIENT)
Dept: ORTHOPEDIC SURGERY | Facility: CLINIC | Age: 64
End: 2024-08-02
Payer: COMMERCIAL

## 2024-08-02 NOTE — TELEPHONE ENCOUNTER
Patient called and stated she had a right hip guided inj on 5/23/24 in radiology and is now having pain in the right hip. Patient wants to know if its too soon for her to get another or if she should come in

## 2024-08-20 ENCOUNTER — OFFICE VISIT (OUTPATIENT)
Dept: ORTHOPEDIC SURGERY | Facility: CLINIC | Age: 64
End: 2024-08-20
Payer: COMMERCIAL

## 2024-08-20 DIAGNOSIS — R29.898 LEFT ARM WEAKNESS: ICD-10-CM

## 2024-08-20 DIAGNOSIS — M75.102 TEAR OF LEFT ROTATOR CUFF, UNSPECIFIED TEAR EXTENT, UNSPECIFIED WHETHER TRAUMATIC: Primary | ICD-10-CM

## 2024-08-20 DIAGNOSIS — M79.602 LEFT ARM PAIN: ICD-10-CM

## 2024-08-20 PROCEDURE — 99213 OFFICE O/P EST LOW 20 MIN: CPT | Performed by: ORTHOPAEDIC SURGERY

## 2024-08-20 PROCEDURE — 1036F TOBACCO NON-USER: CPT | Performed by: ORTHOPAEDIC SURGERY

## 2024-08-20 ASSESSMENT — ENCOUNTER SYMPTOMS
TROUBLE SWALLOWING: 0
COLOR CHANGE: 0
SINUS PRESSURE: 0
FEVER: 0
ARTHRALGIAS: 1
SHORTNESS OF BREATH: 0
FATIGUE: 0
CHILLS: 0
WHEEZING: 0

## 2024-08-20 ASSESSMENT — PAIN SCALES - GENERAL: PAINLEVEL_OUTOF10: 5 - MODERATE PAIN

## 2024-08-20 ASSESSMENT — PAIN - FUNCTIONAL ASSESSMENT: PAIN_FUNCTIONAL_ASSESSMENT: 0-10

## 2024-08-20 NOTE — PROGRESS NOTES
Reason for Appointment  Chief Complaint   Patient presents with    Left Shoulder - Follow-up     History of Present Illness  Patient is a 63 y.o. female here today for follow-up evaluation of continued left shoulder pain. She has had increased pain in the left shoulder and increased weakness for the last few months.  She has had multiple injections over the last year, these have only given her some short-term relief.  Previous MRI from 7 months ago did show partial thickness tearing of the rotator cuff.  No other changes in her past medical history, allergies, or medications.    Past Medical History:   Diagnosis Date    Acute maxillary sinusitis 2019    Back strain 2023    Non Hodgkin's lymphoma (Multi)     Sacral fracture, closed (Multi) 2023    Skin cancer     Tear of right rotator cuff 2023       Past Surgical History:   Procedure Laterality Date    HYSTERECTOMY      OTHER SURGICAL HISTORY  2022     section    OTHER SURGICAL HISTORY  2022    Hysterectomy       Medication Documentation Review Audit       Reviewed by Tosha Saleh PA-C (Physician Assistant) on 24 at 1440      Medication Order Taking? Sig Documenting Provider Last Dose Status   albuterol 2.5 mg /3 mL (0.083 %) nebulizer solution [id number] Yes 3 MILLILITER(S) EVERY 4 HOURS AS NEEDED INHALATION Historical Provider, MD Taking Active   albuterol 90 mcg/actuation inhaler [id number] Yes 2 puffs every 4 hours. Historical Provider, MD Taking Active   amLODIPine (Norvasc) 5 mg tablet [id number] Yes Take 1 tablet (5 mg) by mouth once daily. Ilana Puri,  Taking Active   ascorbic acid (Vitamin C) 500 mg tablet [id number] Yes Take by mouth. Historical Provider, MD Taking Active   azithromycin (Zithromax) 250 mg tablet [id number] Yes Take by mouth once daily. Historical Provider, MD Taking Active   beclomethasone HFA (Qvar RediHaler) 40 mcg/actuation inhaler [id number] Yes 2 puff(s), Inhale, bid, # 1 EA, 3  Refill(s), JASIEL, Type: Maintenance, Pharmacy: [facility]/pharmacy #[id number] Historical Provider, MD Taking Active   Breo Ellipta 100-25 mcg/dose inhaler [id number] Yes  Historical Provider, MD Taking Active   budesonide (Pulmicort) 0.5 mg/2 mL nebulizer solution [id number] Yes USE 2 ML DAILY IN SINUS RINSE Historical Provider, MD Taking Active   buPROPion XL (Wellbutrin XL) 150 mg 24 hr tablet [id number] Yes Take 1 tablet (150 mg) by mouth once daily. Do not crush, chew, or split. Ilana Puri, DO Taking Active   cholecalciferol (Vitamin D-3) 125 MCG (5000 UT) capsule [id number] Yes Take by mouth once daily. Historical Provider, MD Taking Active   fluticasone (Flonase) 50 mcg/actuation nasal spray [id number] Yes once every 24 hours. Historical Provider, MD Taking Active   gabapentin (Neurontin) 100 mg capsule [id number] Yes Take 1 capsule (100 mg) by mouth 3 times a day. Historical Provider, MD Taking Active   levothyroxine (Synthroid, Levoxyl) 75 mcg tablet [id number] Yes TAKE 1 TABLET BY MOUTH EVERY DAY BEFORE BREAKFAST Oral for 90 days Ilana Puri, DO Taking Active   lisinopril 20 mg tablet [id number] Yes Take 1 tablet (20 mg) by mouth 2 times a day. Ilana Puri DO Taking Active   multivitamin with minerals iron-free (Complete MV Adult 50 Plus) [id number] Yes as directed Orally Historical MD Sangeeta Taking Active   nystatin (Mycostatin) 100,000 unit/gram powder [id number] Yes 1 application to affected area Externally Twice a day prn for 90 days Hernandez Rutherford MD Taking Active   pravastatin (Pravachol) 10 mg tablet [id number] Yes Take 1 tablet (10 mg) by mouth once daily at bedtime. Ilana Puri DO Taking Active   SUMAtriptan (Imitrex) 20 mg/actuation nasal spray [id number] Yes USE 1 SPRAY NASALLY, MAY REPEAT IN 2 HOURS AS NEEDED. MAXIMUM 2 SPRAYS IN 24 HOURS Ilana Puri DO Taking Active   zinc gluconate 50 mg tablet [id number] Yes Take by mouth. Historical Provider, MD Taking Active                     Allergies   Allergen Reactions    Simvastatin Myalgia    Topiramate Itching and Rash       Review of Systems   Constitutional:  Negative for chills, fatigue and fever.   HENT:  Negative for nosebleeds, sinus pressure and trouble swallowing.    Respiratory:  Negative for shortness of breath and wheezing.    Cardiovascular:  Negative for chest pain and leg swelling.   Musculoskeletal:  Positive for arthralgias.   Skin:  Negative for color change and pallor.     Exam   On exam the left shoulder shows pain with active forward flexion over 130 degrees.  She does have weakness with resisted external rotation compared to the right side and positive impingement signs on the left.  Mild biceps tenderness anteriorly.  Deltoid is functional.  Good pulses and sensation in the upper extremity.    Assessment   Encounter Diagnoses   Name Primary?    Tear of left rotator cuff, unspecified tear extent, unspecified whether traumatic     Left arm pain     Left arm weakness        Plan   She has had a significant change in function over the last few months with increasing weakness and pain in the shoulder.  Previous MRI has shown partial-thickness tearing of the rotator cuff and she has had multiple injections since that time.  We will order a repeat MRI of the left shoulder to evaluate for progression in the rotator cuff tear.  We will follow-up with her after the MRI and discuss further intervention at that point.    Written by Tosha Mercedes saw, evaluated, and treated the patient with the PA

## 2024-08-30 ENCOUNTER — APPOINTMENT (OUTPATIENT)
Dept: RADIOLOGY | Facility: CLINIC | Age: 64
End: 2024-08-30
Payer: COMMERCIAL

## 2024-09-03 ENCOUNTER — APPOINTMENT (OUTPATIENT)
Dept: ORTHOPEDIC SURGERY | Facility: CLINIC | Age: 64
End: 2024-09-03
Payer: COMMERCIAL

## 2024-09-09 ENCOUNTER — HOSPITAL ENCOUNTER (OUTPATIENT)
Dept: RADIOLOGY | Facility: HOSPITAL | Age: 64
Discharge: HOME | End: 2024-09-09
Payer: COMMERCIAL

## 2024-09-09 DIAGNOSIS — M79.602 LEFT ARM PAIN: ICD-10-CM

## 2024-09-09 DIAGNOSIS — R29.898 LEFT ARM WEAKNESS: ICD-10-CM

## 2024-09-09 DIAGNOSIS — M75.102 TEAR OF LEFT ROTATOR CUFF, UNSPECIFIED TEAR EXTENT, UNSPECIFIED WHETHER TRAUMATIC: ICD-10-CM

## 2024-09-09 PROCEDURE — 73221 MRI JOINT UPR EXTREM W/O DYE: CPT | Mod: LEFT SIDE | Performed by: RADIOLOGY

## 2024-09-09 PROCEDURE — 73221 MRI JOINT UPR EXTREM W/O DYE: CPT | Mod: LT

## 2024-09-17 ENCOUNTER — OFFICE VISIT (OUTPATIENT)
Dept: ORTHOPEDIC SURGERY | Facility: CLINIC | Age: 64
End: 2024-09-17
Payer: COMMERCIAL

## 2024-09-17 DIAGNOSIS — M25.812 SHOULDER IMPINGEMENT, LEFT: Primary | ICD-10-CM

## 2024-09-17 PROCEDURE — 99213 OFFICE O/P EST LOW 20 MIN: CPT | Performed by: ORTHOPAEDIC SURGERY

## 2024-09-17 PROCEDURE — 1036F TOBACCO NON-USER: CPT | Performed by: ORTHOPAEDIC SURGERY

## 2024-09-17 RX ORDER — CEFAZOLIN SODIUM 2 G/100ML
2 INJECTION, SOLUTION INTRAVENOUS ONCE
OUTPATIENT
Start: 2024-09-17 | End: 2024-09-17

## 2024-09-17 RX ORDER — SODIUM CHLORIDE, SODIUM LACTATE, POTASSIUM CHLORIDE, CALCIUM CHLORIDE 600; 310; 30; 20 MG/100ML; MG/100ML; MG/100ML; MG/100ML
20 INJECTION, SOLUTION INTRAVENOUS CONTINUOUS
OUTPATIENT
Start: 2024-09-17

## 2024-09-17 ASSESSMENT — ENCOUNTER SYMPTOMS
WHEEZING: 0
FEVER: 0
CHILLS: 0
SINUS PRESSURE: 0
FATIGUE: 0
SHORTNESS OF BREATH: 0
TROUBLE SWALLOWING: 0
ARTHRALGIAS: 1

## 2024-09-17 ASSESSMENT — PAIN - FUNCTIONAL ASSESSMENT: PAIN_FUNCTIONAL_ASSESSMENT: 0-10

## 2024-09-17 ASSESSMENT — PAIN SCALES - GENERAL: PAINLEVEL_OUTOF10: 4

## 2024-09-17 NOTE — PROGRESS NOTES
Reason for Appointment  Chief Complaint   Patient presents with    Left Shoulder - Results     History of Present Illness  Patient is a 63 y.o. female here today for follow-up evaluation of continued left shoulder pain.  She has had multiple injections in the past and only given her short-term relief.  Recent MRI of the shoulder is reviewed with the report and shows tendinosis of the rotator cuff with some mild partial-thickness tearing and mild glenohumeral joint arthritis.  Her pain is mostly lateral in the shoulder with any activity and lifting.  She would like to discuss surgical intervention today.  No other changes in her past medical history, allergies, or medications.    Past Medical History:   Diagnosis Date    Acute maxillary sinusitis 2019    Back strain 2023    Non Hodgkin's lymphoma (Multi)     Sacral fracture, closed (Multi) 2023    Skin cancer     Tear of right rotator cuff 2023       Past Surgical History:   Procedure Laterality Date    HYSTERECTOMY      OTHER SURGICAL HISTORY  2022     section    OTHER SURGICAL HISTORY  2022    Hysterectomy       Medication Documentation Review Audit       Reviewed by Tosha Saleh PA-C (Physician Assistant) on 24 at 1040      Medication Order Taking? Sig Documenting Provider Last Dose Status   albuterol 2.5 mg /3 mL (0.083 %) nebulizer solution [id number] Yes 3 MILLILITER(S) EVERY 4 HOURS AS NEEDED INHALATION Historical Provider, MD Taking Active   albuterol 90 mcg/actuation inhaler [id number] Yes 2 puffs every 4 hours. Historical Provider, MD Taking Active   amLODIPine (Norvasc) 5 mg tablet [id number] Yes Take 1 tablet (5 mg) by mouth once daily. Ilana Puri, DO Taking Active   ascorbic acid (Vitamin C) 500 mg tablet [id number] Yes Take by mouth. Historical Provider, MD Taking Active   azithromycin (Zithromax) 250 mg tablet [id number] Yes Take by mouth once daily. Historical Provider, MD Taking Active    beclomethasone HFA (Qvar RediHaler) 40 mcg/actuation inhaler [id number] Yes 2 puff(s), Inhale, bid, # 1 EA, 3 Refill(s), JASIEL, Type: Maintenance, Pharmacy: [facility]/pharmacy #[id number] Historical Provider, MD Taking Active   Breo Ellipta 100-25 mcg/dose inhaler [id number] Yes  Historical Provider, MD Taking Active   budesonide (Pulmicort) 0.5 mg/2 mL nebulizer solution [id number] Yes USE 2 ML DAILY IN SINUS RINSE Historical Provider, MD Taking Active   buPROPion XL (Wellbutrin XL) 150 mg 24 hr tablet [id number] Yes Take 1 tablet (150 mg) by mouth once daily. Do not crush, chew, or split. Ilana Puri, DO Taking Active   cholecalciferol (Vitamin D-3) 125 MCG (5000 UT) capsule [id number] Yes Take by mouth once daily. Historical Provider, MD Taking Active   fluticasone (Flonase) 50 mcg/actuation nasal spray [id number] Yes once every 24 hours. Historical Provider, MD Taking Active   gabapentin (Neurontin) 100 mg capsule [id number] Yes Take 1 capsule (100 mg) by mouth 3 times a day. Historical Provider, MD Taking Active   levothyroxine (Synthroid, Levoxyl) 75 mcg tablet [id number] Yes TAKE 1 TABLET BY MOUTH EVERY DAY BEFORE BREAKFAST Oral for 90 days Ilana Puri, DO Taking Active   lisinopril 20 mg tablet [id number] Yes Take 1 tablet (20 mg) by mouth 2 times a day. Ilana Puri, DO Taking Active   multivitamin with minerals iron-free (Complete MV Adult 50 Plus) [id number] Yes as directed Orally Historical Provider, MD Taking Active   nystatin (Mycostatin) 100,000 unit/gram powder [id number] Yes 1 application to affected area Externally Twice a day prn for 90 days Hernandez Rutherford MD Taking Active   pravastatin (Pravachol) 10 mg tablet [id number] Yes Take 1 tablet (10 mg) by mouth once daily at bedtime. Ilana Puri DO Taking Active   SUMAtriptan (Imitrex) 20 mg/actuation nasal spray [id number] Yes USE 1 SPRAY NASALLY, MAY REPEAT IN 2 HOURS AS NEEDED. MAXIMUM 2 SPRAYS IN 24 HOURS Ilana Puri DO Taking Active   zinc  gluconate 50 mg tablet [id number] Yes Take by mouth. Historical Provider, MD Taking Active                    Allergies   Allergen Reactions    Simvastatin Myalgia    Topiramate Itching and Rash       Review of Systems   Constitutional:  Negative for chills, fatigue and fever.   HENT:  Negative for nosebleeds, sinus pressure and trouble swallowing.    Respiratory:  Negative for shortness of breath and wheezing.    Cardiovascular:  Negative for chest pain and leg swelling.   Musculoskeletal:  Positive for arthralgias.   Skin:  Negative for pallor and rash.     Exam   On exam left shoulder shows good active forward flexion, she has markedly positive impingement signs on the left but fairly good cuff strength with resisted external rotation.  Maybe some mild weakness on the left compared to the right side.  Deltoid is functional.  Minimal AC joint tenderness today, some mild biceps tenderness on the left.  Good elbow, wrist, and digital motion.  Good pulses and sensation in the upper extremity.    Assessment   Encounter Diagnosis   Name Primary?    Shoulder impingement, left Yes       Plan   She has failed conservative treatment with multiple injections with no longstanding relief.  We discussed operative treatment today, she understands the risks of surgery including nerve, artery, tendon damage, infection, continued pain, need for future surgery and the recovery time needed.  She will call and schedule a left shoulder arthroscopic decompression.

## 2024-10-02 PROBLEM — M25.812 SHOULDER IMPINGEMENT, LEFT: Status: ACTIVE | Noted: 2024-09-17

## 2024-10-03 ENCOUNTER — PREP FOR PROCEDURE (OUTPATIENT)
Dept: ORTHOPEDIC SURGERY | Facility: HOSPITAL | Age: 64
End: 2024-10-03
Payer: COMMERCIAL

## 2024-10-03 DIAGNOSIS — M25.812 IMPINGEMENT OF LEFT SHOULDER: ICD-10-CM

## 2024-10-03 DIAGNOSIS — M75.102 TEAR OF LEFT ROTATOR CUFF, UNSPECIFIED TEAR EXTENT, UNSPECIFIED WHETHER TRAUMATIC: Primary | ICD-10-CM

## 2024-10-09 ENCOUNTER — APPOINTMENT (OUTPATIENT)
Dept: DERMATOLOGY | Facility: CLINIC | Age: 64
End: 2024-10-09
Payer: COMMERCIAL

## 2024-10-09 DIAGNOSIS — B07.9 VIRAL WARTS, UNSPECIFIED TYPE: Primary | ICD-10-CM

## 2024-10-09 PROCEDURE — 99203 OFFICE O/P NEW LOW 30 MIN: CPT

## 2024-10-09 PROCEDURE — 17110 DESTRUCTION B9 LES UP TO 14: CPT

## 2024-10-09 NOTE — PROGRESS NOTES
Subjective     Monica Durham is a 63 y.o. female who presents for the following: Wart. Present for 3 years on the left 3rd finger. Her PCP has frozen it 3 times, but the wart keeps coming back. She has tried multiple OTC treatments including salicylic acid.     Review of Systems:  No other skin or systemic complaints other than what is documented elsewhere in the note.    The following portions of the chart were reviewed this encounter and updated as appropriate:   Allergies         Skin Cancer History  No skin cancer on file.      Specialty Problems          Dermatology Problems    Plantar wart of left foot    Viral wart on finger    Basal cell carcinoma    Hx of basal cell carcinoma        Objective   Well appearing patient in no apparent distress; mood and affect are within normal limits.    A focused skin examination was performed. All findings within normal limits unless otherwise noted below.    Assessment/Plan   1. Viral warts, unspecified type  Left Dorsal Mid 3rd Finger  Verrucous papule on left 3rd finger.    -Discussed nature of condition  -Multiple treatments in office are often needed  -Diligent at home therapy is often needed. Recommended using Salicylic Acid 40%.   -Cryotherapy today  -Possible side effects of liquid nitrogen treatment reviewed including formation of blisters, crusting, tenderness, scar, and discoloration which may be permanent.  -Patient advised to return the office for re-evaluation if the treated lesion(s) do not resolve within 4-6 weeks. Patient verbalizes understanding.    Destr of lesion - Left Dorsal Mid 3rd Finger  Complexity: simple    Destruction method: cryotherapy    Informed consent: discussed and consent obtained    Debridement: hyperkeratotic portion removed with sharp debridement    Lesion destroyed using liquid nitrogen: Yes    Cryotherapy cycles:  2  Outcome: patient tolerated procedure well with no complications    Post-procedure details: wound care  instructions given        Follow up in 4 weeks.

## 2024-10-14 ENCOUNTER — PRE-ADMISSION TESTING (OUTPATIENT)
Dept: PREADMISSION TESTING | Facility: HOSPITAL | Age: 64
End: 2024-10-14
Payer: COMMERCIAL

## 2024-10-14 VITALS
WEIGHT: 191 LBS | HEART RATE: 67 BPM | HEIGHT: 63 IN | DIASTOLIC BLOOD PRESSURE: 84 MMHG | RESPIRATION RATE: 16 BRPM | OXYGEN SATURATION: 99 % | BODY MASS INDEX: 33.84 KG/M2 | SYSTOLIC BLOOD PRESSURE: 138 MMHG | TEMPERATURE: 97 F

## 2024-10-14 DIAGNOSIS — M25.812 IMPINGEMENT OF LEFT SHOULDER: ICD-10-CM

## 2024-10-14 DIAGNOSIS — Z01.818 PRE-OP TESTING: Primary | ICD-10-CM

## 2024-10-14 LAB
ANION GAP SERPL CALCULATED.3IONS-SCNC: 15 MMOL/L (ref 10–20)
BASOPHILS # BLD AUTO: 0.05 X10*3/UL (ref 0–0.1)
BASOPHILS NFR BLD AUTO: 0.8 %
BUN SERPL-MCNC: 12 MG/DL (ref 6–23)
CALCIUM SERPL-MCNC: 9.2 MG/DL (ref 8.6–10.3)
CHLORIDE SERPL-SCNC: 105 MMOL/L (ref 98–107)
CO2 SERPL-SCNC: 25 MMOL/L (ref 21–32)
CREAT SERPL-MCNC: 0.79 MG/DL (ref 0.5–1.05)
EGFRCR SERPLBLD CKD-EPI 2021: 84 ML/MIN/1.73M*2
EOSINOPHIL # BLD AUTO: 0.08 X10*3/UL (ref 0–0.7)
EOSINOPHIL NFR BLD AUTO: 1.2 %
ERYTHROCYTE [DISTWIDTH] IN BLOOD BY AUTOMATED COUNT: 12.9 % (ref 11.5–14.5)
GLUCOSE SERPL-MCNC: 86 MG/DL (ref 74–99)
HCT VFR BLD AUTO: 46.4 % (ref 36–46)
HGB BLD-MCNC: 15.1 G/DL (ref 12–16)
IMM GRANULOCYTES # BLD AUTO: 0.02 X10*3/UL (ref 0–0.7)
IMM GRANULOCYTES NFR BLD AUTO: 0.3 % (ref 0–0.9)
LYMPHOCYTES # BLD AUTO: 2.58 X10*3/UL (ref 1.2–4.8)
LYMPHOCYTES NFR BLD AUTO: 40.1 %
MCH RBC QN AUTO: 30 PG (ref 26–34)
MCHC RBC AUTO-ENTMCNC: 32.5 G/DL (ref 32–36)
MCV RBC AUTO: 92 FL (ref 80–100)
MONOCYTES # BLD AUTO: 0.68 X10*3/UL (ref 0.1–1)
MONOCYTES NFR BLD AUTO: 10.6 %
NEUTROPHILS # BLD AUTO: 3.02 X10*3/UL (ref 1.2–7.7)
NEUTROPHILS NFR BLD AUTO: 47 %
NRBC BLD-RTO: 0 /100 WBCS (ref 0–0)
PLATELET # BLD AUTO: 218 X10*3/UL (ref 150–450)
POTASSIUM SERPL-SCNC: 4 MMOL/L (ref 3.5–5.3)
RBC # BLD AUTO: 5.03 X10*6/UL (ref 4–5.2)
SODIUM SERPL-SCNC: 141 MMOL/L (ref 136–145)
WBC # BLD AUTO: 6.4 X10*3/UL (ref 4.4–11.3)

## 2024-10-14 PROCEDURE — 36415 COLL VENOUS BLD VENIPUNCTURE: CPT

## 2024-10-14 PROCEDURE — 82565 ASSAY OF CREATININE: CPT

## 2024-10-14 PROCEDURE — 87081 CULTURE SCREEN ONLY: CPT | Mod: TRILAB,WESLAB

## 2024-10-14 PROCEDURE — 99204 OFFICE O/P NEW MOD 45 MIN: CPT | Performed by: PHYSICIAN ASSISTANT

## 2024-10-14 PROCEDURE — 85025 COMPLETE CBC W/AUTO DIFF WBC: CPT

## 2024-10-14 RX ORDER — ACETAMINOPHEN, DIPHENHYDRAMINE HCL, PHENYLEPHRINE HCL 325; 25; 5 MG/1; MG/1; MG/1
TABLET ORAL DAILY
COMMUNITY

## 2024-10-14 RX ORDER — CHLORHEXIDINE GLUCONATE ORAL RINSE 1.2 MG/ML
SOLUTION DENTAL
Qty: 473 ML | Refills: 0 | Status: SHIPPED | OUTPATIENT
Start: 2024-10-14 | End: 2024-10-23

## 2024-10-14 RX ORDER — POTASSIUM GLUCONATE 2 MEQ
TABLET ORAL
COMMUNITY

## 2024-10-14 RX ORDER — AZELASTINE 1 MG/ML
1 SPRAY, METERED NASAL EVERY MORNING
COMMUNITY

## 2024-10-14 RX ORDER — MOMETASONE FUROATE AND FORMOTEROL FUMARATE DIHYDRATE 100; 5 UG/1; UG/1
2 AEROSOL RESPIRATORY (INHALATION)
COMMUNITY
Start: 2024-09-13

## 2024-10-14 ASSESSMENT — DUKE ACTIVITY SCORE INDEX (DASI)
CAN YOU HAVE SEXUAL RELATIONS: YES
CAN YOU WALK A BLOCK OR TWO ON LEVEL GROUND: YES
TOTAL_SCORE: 42.7
CAN YOU WALK INDOORS, SUCH AS AROUND YOUR HOUSE: YES
CAN YOU PARTICIPATE IN STRENOUS SPORTS LIKE SWIMMING, SINGLES TENNIS, FOOTBALL, BASKETBALL, OR SKIING: NO
CAN YOU CLIMB A FLIGHT OF STAIRS OR WALK UP A HILL: YES
CAN YOU TAKE CARE OF YOURSELF (EAT, DRESS, BATHE, OR USE TOILET): YES
DASI METS SCORE: 8
CAN YOU PARTICIPATE IN MODERATE RECREATIONAL ACTIVITIES LIKE GOLF, BOWLING, DANCING, DOUBLES TENNIS OR THROWING A BASEBALL OR FOOTBALL: YES
CAN YOU DO HEAVY WORK AROUND THE HOUSE LIKE SCRUBBING FLOORS OR LIFTING AND MOVING HEAVY FURNITURE: NO
CAN YOU DO MODERATE WORK AROUND THE HOUSE LIKE VACUUMING, SWEEPING FLOORS OR CARRYING GROCERIES: YES
CAN YOU DO YARD WORK LIKE RAKING LEAVES, WEEDING OR PUSHING A MOWER: YES
CAN YOU DO LIGHT WORK AROUND THE HOUSE LIKE DUSTING OR WASHING DISHES: YES
CAN YOU RUN A SHORT DISTANCE: YES

## 2024-10-14 NOTE — H&P (VIEW-ONLY)
"CPM/PAT Evaluation       Name: Monica Durham (Monica Durham)  /Age: 1960/63 y.o.     In-Person       Chief Complaint: \"shoulder pain\"    HPI  The patient is a 63 year old female.  For more than 1 year she has experienced progressive left shoulder pain when arm is hanging down, and with reaching and lifting.  The pain radiates to the left upper arm.  She has associated weakness, but no swelling, numbness or tingling.  She has had multiple cortisone injections and the last injection 6 months ago was only helpful for 1 month.  Surgical intervention is recommended at this time.    Past Medical History:   Diagnosis Date    Acute maxillary sinusitis 2019    Arthritis     Asthma     Back strain 2023    CKD (chronic kidney disease)     Depression     Hyperlipidemia     Hypertension     Hypothyroidism     MGUS (monoclonal gammopathy of unknown significance)     Non Hodgkin's lymphoma     PONV (postoperative nausea and vomiting)     Sacral fracture, closed (Multi) 2023    Skin cancer     Tear of right rotator cuff 2023       Past Surgical History:   Procedure Laterality Date    ABDOMINAL HERNIA REPAIR  2009     SECTION, LOW TRANSVERSE  1992     SECTION, LOW TRANSVERSE  1993    HYSTERECTOMY  2004    LITHOTRIPSY Left 2011    MICRODISCECTOMY LUMBAR  2004    OTHER SURGICAL HISTORY  2015    Colon resection - diverticulosis    OTHER SURGICAL HISTORY  2012    Brachioplasty    ROTATOR CUFF REPAIR Right 2018    TONSILLECTOMY  1966    TOTAL SHOULDER ARTHROPLASTY Right      Family History   Problem Relation Name Age of Onset    Hyperlipidemia Mother      Diabetes Mother      Hypertension Mother      Hypertension Father      Cancer Father      Cancer Sister      Breast cancer Sister  50    Breast cancer Sister  65     Social History     Tobacco Use    Smoking status: Former     Current packs/day: 0.00     Average packs/day: 1 pack/day for 12.0 years (12.0 ttl pk-yrs)     Types: " Cigarettes     Start date:      Quit date:      Years since quittin.8    Smokeless tobacco: Never   Substance Use Topics    Alcohol use: Yes     Alcohol/week: 2.0 standard drinks of alcohol     Types: 2 Standard drinks or equivalent per week     Social History     Substance and Sexual Activity   Drug Use Yes    Frequency: 4.0 times per week    Types: Marijuana     Allergies   Allergen Reactions    Simvastatin Myalgia    Topiramate Itching and Rash     Current Outpatient Medications   Medication Sig Dispense Refill    Dulera 100-5 mcg/actuation inhaler Inhale 2 puffs 2 times a day.      albuterol 2.5 mg /3 mL (0.083 %) nebulizer solution 3 MILLILITER(S) EVERY 4 HOURS AS NEEDED INHALATION      albuterol 90 mcg/actuation inhaler 2 puffs every 4 hours.      amLODIPine (Norvasc) 5 mg tablet Take 1 tablet (5 mg) by mouth once daily. 90 tablet 1    ascorbic acid (Vitamin C) 500 mg tablet Take by mouth.      azelastine (Astelin) 137 mcg (0.1 %) nasal spray Administer 1 spray into each nostril once daily in the morning. Use in each nostril as directed      azithromycin (Zithromax) 250 mg tablet Take by mouth once daily.      beclomethasone HFA (Qvar RediHaler) 40 mcg/actuation inhaler 2 puff(s), Inhale, bid, # 1 EA, 3 Refill(s), JASIEL, Type: Maintenance, Pharmacy: [facility]/pharmacy #[id number]      budesonide (Pulmicort) 0.5 mg/2 mL nebulizer solution Take 2 mL (0.5 mg) by nebulization once daily as needed.      buPROPion XL (Wellbutrin XL) 150 mg 24 hr tablet Take 1 tablet (150 mg) by mouth once daily. Do not crush, chew, or split. 90 tablet 1    chlorhexidine (Peridex) 0.12 % solution Use as directed. 473 mL 0    cholecalciferol (Vitamin D-3) 125 MCG (5000 UT) capsule Take by mouth once daily.      docosahexaenoic acid/epa (FISH OIL ORAL) Take by mouth.      docusate sodium (Colace) 50 mg capsule Take 1 capsule (50 mg) by mouth 2 times a day.      fluticasone (Flonase) 50 mcg/actuation nasal spray once every 24 hours.       "gabapentin (Neurontin) 100 mg capsule Take 3 capsules (300 mg) by mouth once daily at bedtime.      Lactobacillus acidophilus (PROBIOTIC ACIDOPHILUS ORAL) Take by mouth.      levothyroxine (Synthroid, Levoxyl) 75 mcg tablet TAKE 1 TABLET BY MOUTH EVERY DAY BEFORE BREAKFAST Oral for 90 days 90 tablet 3    lisinopril 20 mg tablet Take 1 tablet (20 mg) by mouth 2 times a day. 180 tablet 1    melatonin 10 mg tablet Take by mouth once daily.      multivitamin with minerals iron-free (Complete MV Adult 50 Plus) as directed Orally      nystatin (Mycostatin) 100,000 unit/gram powder 1 application to affected area Externally Twice a day prn for 90 days      potassium gluconate 500 mg (83 mg) tablet Take by mouth.      pravastatin (Pravachol) 10 mg tablet Take 1 tablet (10 mg) by mouth once daily at bedtime. 90 tablet 3    SOY ISOFLAVONES ORAL Take by mouth.      SUMAtriptan (Imitrex) 20 mg/actuation nasal spray USE 1 SPRAY NASALLY, MAY REPEAT IN 2 HOURS AS NEEDED. MAXIMUM 2 SPRAYS IN 24 HOURS 18 each 3    TURMERIC ORAL Take by mouth.      zinc gluconate 50 mg tablet Take by mouth.       No current facility-administered medications for this visit.     Review of Systems   Musculoskeletal:         See HPI   All other systems reviewed and are negative.    /84   Pulse 67   Temp 36.1 °C (97 °F) (Temporal)   Resp 16   Ht 1.6 m (5' 3\")   Wt 86.6 kg (191 lb)   SpO2 99%   BMI 33.83 kg/m²     Physical Exam  Vitals reviewed.   Constitutional:       Appearance: Normal appearance.   HENT:      Head: Normocephalic and atraumatic.      Mouth/Throat:      Mouth: Mucous membranes are moist.      Pharynx: Oropharynx is clear.   Eyes:      Extraocular Movements: Extraocular movements intact.      Pupils: Pupils are equal, round, and reactive to light.   Cardiovascular:      Rate and Rhythm: Normal rate and regular rhythm.      Heart sounds: Normal heart sounds.   Pulmonary:      Effort: Pulmonary effort is normal.      Breath " sounds: Normal breath sounds.   Abdominal:      General: Bowel sounds are normal.      Palpations: Abdomen is soft.   Musculoskeletal:      Comments: Tenderness with palpation left shoulder.  Mildly limited range of motion left shoulder.    Skin:     General: Skin is warm and dry.   Neurological:      General: No focal deficit present.      Mental Status: She is alert and oriented to person, place, and time.   Psychiatric:         Mood and Affect: Mood normal.         Behavior: Behavior normal.        PAT AIRWAY:   Airway:     Mallampati::  II    TM distance::  >3 FB    Neck ROM::  Full   Teeth intact    ASA:  III  DASI SCORE:  42.7  METS SCORE:  8  CHAD2 SCORE:  2.8%  REVISED CARDIAC RISK INDEX:  0.4%  STOP BANG SCORE:  3  CAPRINI DVT SCORE:  8    MRSA, CBC, BMP ordered during PAT visit    Assessment and Plan:     Left shoulder impingement:  Left shoulder arthroscopy, subacromial decompression  Asthma - using Pulmicort, Dulera and albuterol inhaler as needed  Hypertension - lisinopril  Chronic Kidney Disease - stage II  H/O non-Hodgkin's lymphoma/chronic lymphocytic lymphoma - chemotherapy 2011 - currently stable - followed by Dr. Christopher Alvarenga - [facility]  Post-operative nausea and vomiting    Allyssa Walker PA-C

## 2024-10-14 NOTE — PREPROCEDURE INSTRUCTIONS
PAT DISCHARGE INSTRUCTIONS    Please call the Same Day Surgery (SDS) Department of the hospital where your procedure will be performed after 2:00 PM the day before your surgery. If you are scheduled on a Monday, or a Tuesday following a Monday holiday, you will need to call on the last business day prior to your surgery.    [facility]  [address], [address]  [phone]    [facility]  [address] [place] [address] [phone]    [facility]  [address]  [place] [id number]  [phone]    Please let your surgeon know if:      You develop any open sores, shingles, burning or painful urination as these may increase your risk of an infection.   You no longer wish to have the surgery.   Any other personal circumstances change that may lead to the need to cancel or defer this surgery-such as being sick or getting admitted to any hospital within one week of your planned procedure.    Your contact details change, such as a change of address or phone number.    Starting now:     Please DO NOT drink alcohol or smoke for 24 hours before surgery. It is well known that quitting smoking can make a huge difference to your health and recovery from surgery. The longer you abstain from smoking, the better your chances of a healthy recovery. If you need help with quitting, call 1-800-QUIT-NOW to be connected to a trained counselor who will discuss the best methods to help you quit.     Before your surgery:    Please stop all supplements 7 days prior to surgery. Or as directed by your surgeon.   Please stop taking NSAID pain medicine such as Advil and Motrin 7 days before surgery.    If you develop any fever, cough, cold, rashes, cuts, scratches, scrapes, urinary symptoms or infection anywhere on your body (including teeth and gums) prior to surgery, please call your surgeon’s office as soon as  possible. This may require treatment to reduce the chance of cancellation on the day of surgery.    The day before your surgery:   Get a good night’s rest.  Use the special soap for bathing if you have been instructed to use one.    Scheduled surgery times may change and you will be notified if this occurs - please check your personal voicemail for any updates.     On the morning of surgery:   Wear comfortable, loose fitting clothes which open in the front. Please do not wear moisturizers, creams, lotions, makeup or perfume.    Please bring with you to surgery:   Photo ID and insurance card   Current list of medicines and allergies   Pacemaker/ Defibrillator/Heart stent cards   CPAP machine and mask    Slings/ splints/ crutches   A copy of your complete advanced directive/DHPOA.    Please do NOT bring with you to surgery:   All jewelry and valuables should be left at home.   Prosthetic devices such as contact lenses, hearing aids, dentures, eyelash extensions, hairpins and body piercings must be removed prior to going in to the surgical suite.    After outpatient surgery:   A responsible adult MUST accompany you at the time of discharge and stay with you for 24 hours after your surgery. You may NOT drive yourself home after surgery.    Do not drive, operate machinery, make critical decisions or do activities that require co-ordination or balance until after a night’s sleep.   Do not drink alcoholic beverages for 24 hours.   Instructions for resuming your medications will be provided by your surgeon.    CALL YOUR DOCTOR AFTER SURGERY IF YOU HAVE:     Chills and/or a fever of 101° F or higher.    Redness, swelling, pus or drainage from your surgical wound or a bad smell from the wound.    Lightheadedness, fainting or confusion.    Persistent vomiting (throwing up) and are not able to eat or drink for 12 hours.    Three or more loose, watery bowel movements in 24 hours (diarrhea).   Difficulty or pain while urinating(  after non-urological surgery)    Pain and swelling in your legs, especially if it is only on one side.    Difficulty breathing or are breathing faster than normal.    Any new concerning symptoms.          Preoperative Fasting Guidelines    Why must I stop eating and drinking near surgery time?  With sedation, food or liquid in your stomach can enter your lungs causing serious complications  Increases nausea and vomiting    When do I need to stop eating and drinking before my surgery?  Do not eat any food after midnight the night before your surgery/procedure.  You may have up to 13 ounces of clear liquid until TWO hours before your instructed arrival time to the hospital.  This includes water, black tea/coffee, (no milk or cream) apple juice, and electrolyte drinks (Gatorade)  You may chew gum until TWO hours before your surgery/procedure      Patient Information: Pre-Operative Infection Prevention Measures     Why did I have my nose swabbed?  The purpose of the swab is to identify Staphylococcus aureus inside your nose. The swab was sent to the laboratory for culture.  A positive swab/culture for Staphylococcus aureus is called colonization or carriage.      What is Staphylococcus aureus?  Staphylococcus aureus, also known as “staph”, is a germ found on the skin or in the nose of healthy people.  Sometimes Staphylococcus aureus can get into the body and cause an infection.  This can be minor (such as pimples, boils, or other skin problems).  It might also be serious (such as a blood infection, pneumonia, or a surgical site infection).    What is Staphylococcus aureus colonization or carriage?  Colonization or carriage means that a person has the germ but is not sick from it.  These bacteria can be spread on the hands or when breathing or sneezing.    How is Staphylococcus aureus spread?  It is most often spread by close contact with a person or item that carries it.    What happens if my culture is positive for  Staphylococcus aureus?  Your doctor/medical team will use this information to guide any antibiotic treatment which may be necessary.  Regardless of the culture results, we will clean the inside of your nose with a betadine swab just before you have your surgery.      Will I get an infection if I have Staphylococcus aureus in my nose or on my skin?  Anyone can get an infection with Staphylococcus aureus.  However, the best way to reduce your risk of infection is to follow the instructions provided to you for the use of your CHG soap and dental rinse.        Patient Information: Oral/Dental Rinse    What is oral/dental rinse?   It is a mouthwash. It is a way of cleaning the mouth with a germ-killing solution before your surgery.  The solution contains chlorhexidine, commonly known as CHG.   It is used inside the mouth to kill a bacteria known as Staphylococcus aureus.  Let your doctor know if you are allergic to Chlorhexidine.    Why do I need to use CHG oral/dental rinse?  The CHG oral/dental rinse helps to kill a bacteria in your mouth known as Staphylococcus aureus.     This reduces the risk of infection at the surgical site.      Using your CHG oral/dental rinse  STEPS:  Use your CHG oral/dental rinse after you brush your teeth the night before (at bedtime) and the morning of your surgery.  Follow all directions on your prescription label.    Use the cap on the container to measure 15ml   Swish (gargle if you can) the mouthwash in your mouth for at least 30 seconds, (do not swallow) and spit out  After you use your CHG rinse, do not rinse your mouth with water, drink or eat.  Please refer to the prescription label for the appropriate time to resume oral intake      What side effects might I have using the CHG oral/dental rinse?  CHG rinse will stick to plaque on the teeth.  Brush and floss just before use.  Teeth brushing will help avoid staining of plaque during use.      Patient Information: Home Preoperative  Antibacterial Shower      What is a home preoperative antibacterial shower?  This shower is a way of cleaning the skin with a germ-killing solution before surgery.  The solution contains chlorhexidine, commonly known as CHG.  CHG is a skin cleanser with germ-killing ability.  Let your doctor know if you are allergic to chlorhexidine.    Why do I need to take a preoperative antibacterial shower?  Skin is not sterile.  It is best to try to make your skin as free of germs as possible before surgery.  Proper cleansing with a germ-killing soap before surgery can lower the number of germs on your skin.  This helps to reduce the risk of infection at the surgical site.  Following the instructions listed below will help you prepare your skin for surgery.      How do I use the solution?  Steps:  Begin using your CHG soap 5 days before your scheduled surgery on ____10/23/2024____________________.    First, wash and rinse your hair using shampoo.  Keep CHG soap away from ear canals and eyes.  Rinse completely, do not condition.  Hair extensions should be removed.  Wash your face with your normal soap and rinse.    Apply the CHG solution to a clean wet washcloth.  Turn the water off or move away from the water spray to avoid premature rinsing of the CHG soap as you are applying.   Firmly lather your entire body from the neck down.  Do not use on your face.  Pay special attention to the area(s) where your incision(s) will be located unless they are on your face.  Avoid scrubbing your skin too hard.  The important point is to have the CHG soap sit on your skin for 3 minutes.    When the 3 minutes are up, turn on the water and rinse the CHG solution off your body completely.   DO NOT wash with regular soap after you have used the CHG soap solution  Pat yourself dry with a clean, freshly-laundered towel.  DO NOT apply powders, deodorants, or lotions.  Dress in clean, freshly laundered nightclothes.    Be sure to sleep with clean,  freshly laundered sheets.  Be aware that CHG will cause stains on fabrics; if you wash them with bleach after use.  Rinse your washcloth and other linens that have contact with CHG completely.  Use only non-chlorine detergents to launder the items used.   The morning of surgery is the fifth day.  Repeat the above steps and dress in clean comfortable clothing     Whom should I contact if I have any questions regarding the use of CHG soap?  Call the [facility] at [phone] if you have any questions.              Medication List            Accurate as of October 14, 2024 12:36 PM. Always use your most recent med list.                * albuterol 90 mcg/actuation inhaler  Medication Adjustments for Surgery: Take/Use as prescribed     * albuterol 2.5 mg /3 mL (0.083 %) nebulizer solution  Medication Adjustments for Surgery: Take/Use as prescribed     amLODIPine 5 mg tablet  Commonly known as: Norvasc  Take 1 tablet (5 mg) by mouth once daily.  Medication Adjustments for Surgery: Take/Use as prescribed  Additional Medication Adjustments for Surgery: Other (Comment)  Notes to patient: CONTINUE PER USUAL/TAKE NIGHT BEFORE SURGERY     ascorbic acid 500 mg tablet  Commonly known as: Vitamin C  Additional Medication Adjustments for Surgery: Take last dose 7 days before surgery     azelastine 137 mcg (0.1 %) nasal spray  Commonly known as: Astelin  Medication Adjustments for Surgery: Take on the morning of surgery     azithromycin 250 mg tablet  Commonly known as: Zithromax  Medication Adjustments for Surgery: Take/Use as prescribed     budesonide 0.5 mg/2 mL nebulizer solution  Commonly known as: Pulmicort  Medication Adjustments for Surgery: Take/Use as prescribed     buPROPion  mg 24 hr tablet  Commonly known as: Wellbutrin XL  Take 1 tablet (150 mg) by mouth once daily. Do not crush, chew, or split.  Medication Adjustments for Surgery: Take on the  morning of surgery     cholecalciferol 125 MCG (5000 UT) capsule  Commonly known as: Vitamin D-3  Additional Medication Adjustments for Surgery: Take last dose 7 days before surgery     Complete MV Adult 50 Plus 0.4 mg-300 mcg- 250 mcg  Generic drug: multivitamin with minerals iron-free  Additional Medication Adjustments for Surgery: Take last dose 7 days before surgery     docusate sodium 50 mg capsule  Commonly known as: Colace  Medication Adjustments for Surgery: Take/Use as prescribed     Dulera 100-5 mcg/actuation inhaler  Generic drug: mometasone-formoterol  Medication Adjustments for Surgery: Take on the morning of surgery     FISH OIL ORAL  Additional Medication Adjustments for Surgery: Take last dose 7 days before surgery     fluticasone 50 mcg/actuation nasal spray  Commonly known as: Flonase  Medication Adjustments for Surgery: Take on the morning of surgery     gabapentin 100 mg capsule  Commonly known as: Neurontin  Medication Adjustments for Surgery: Take on the morning of surgery     levothyroxine 75 mcg tablet  Commonly known as: Synthroid, Levoxyl  TAKE 1 TABLET BY MOUTH EVERY DAY BEFORE BREAKFAST Oral for 90 days  Medication Adjustments for Surgery: Take on the morning of surgery     lisinopril 20 mg tablet  Take 1 tablet (20 mg) by mouth 2 times a day.  Medication Adjustments for Surgery: Take last dose 1 day (24 hours) before surgery  Additional Medication Adjustments for Surgery: Other (Comment)  Notes to patient: Last dose before surgery will be Oct 22 morning     melatonin 10 mg tablet  Additional Medication Adjustments for Surgery: Other (Comment)  Notes to patient: CONTINUE PER USUAL/TAKE NIGHT BEFORE SURGERY     nystatin 100,000 unit/gram powder  Commonly known as: Mycostatin  Medication Adjustments for Surgery: Take/Use as prescribed     potassium gluconate 500 mg (83 mg) tablet  Medication Adjustments for Surgery: Do Not take on the morning of surgery     pravastatin 10 mg  tablet  Commonly known as: Pravachol  Take 1 tablet (10 mg) by mouth once daily at bedtime.  Medication Adjustments for Surgery: Take/Use as prescribed  Additional Medication Adjustments for Surgery: Other (Comment)  Notes to patient: CONTINUE PER USUAL/TAKE NIGHT BEFORE SURGERY     PROBIOTIC ACIDOPHILUS ORAL  Medication Adjustments for Surgery: Do Not take on the morning of surgery     Qvar RediHaler 40 mcg/actuation inhaler  Generic drug: beclomethasone HFA  Medication Adjustments for Surgery: Take on the morning of surgery     SOY ISOFLAVONES ORAL  Additional Medication Adjustments for Surgery: Take last dose 7 days before surgery     SUMAtriptan 20 mg/actuation nasal spray  Commonly known as: Imitrex  USE 1 SPRAY NASALLY, MAY REPEAT IN 2 HOURS AS NEEDED. MAXIMUM 2 SPRAYS IN 24 HOURS  Medication Adjustments for Surgery: Take/Use as prescribed     TURMERIC ORAL  Additional Medication Adjustments for Surgery: Take last dose 7 days before surgery     zinc gluconate 50 mg tablet  Additional Medication Adjustments for Surgery: Take last dose 7 days before surgery           * This list has 2 medication(s) that are the same as other medications prescribed for you. Read the directions carefully, and ask your doctor or other care provider to review them with you.

## 2024-10-14 NOTE — CPM/PAT H&P
"CPM/PAT Evaluation       Name: Monica Durham (Monica Durham)  /Age: 1960/63 y.o.     In-Person       Chief Complaint: \"shoulder pain\"    HPI  The patient is a 63 year old female.  For more than 1 year she has experienced progressive left shoulder pain when arm is hanging down, and with reaching and lifting.  The pain radiates to the left upper arm.  She has associated weakness, but no swelling, numbness or tingling.  She has had multiple cortisone injections and the last injection 6 months ago was only helpful for 1 month.  Surgical intervention is recommended at this time.    Past Medical History:   Diagnosis Date    Acute maxillary sinusitis 2019    Arthritis     Asthma     Back strain 2023    CKD (chronic kidney disease)     Depression     Hyperlipidemia     Hypertension     Hypothyroidism     MGUS (monoclonal gammopathy of unknown significance)     Non Hodgkin's lymphoma     PONV (postoperative nausea and vomiting)     Sacral fracture, closed (Multi) 2023    Skin cancer     Tear of right rotator cuff 2023       Past Surgical History:   Procedure Laterality Date    ABDOMINAL HERNIA REPAIR  2009     SECTION, LOW TRANSVERSE  1992     SECTION, LOW TRANSVERSE  1993    HYSTERECTOMY  2004    LITHOTRIPSY Left 2011    MICRODISCECTOMY LUMBAR  2004    OTHER SURGICAL HISTORY  2015    Colon resection - diverticulosis    OTHER SURGICAL HISTORY  2012    Brachioplasty    ROTATOR CUFF REPAIR Right 2018    TONSILLECTOMY  1966    TOTAL SHOULDER ARTHROPLASTY Right      Family History   Problem Relation Name Age of Onset    Hyperlipidemia Mother      Diabetes Mother      Hypertension Mother      Hypertension Father      Cancer Father      Cancer Sister      Breast cancer Sister  50    Breast cancer Sister  65     Social History     Tobacco Use    Smoking status: Former     Current packs/day: 0.00     Average packs/day: 1 pack/day for 12.0 years (12.0 ttl pk-yrs)     Types: " Cigarettes     Start date:      Quit date:      Years since quittin.8    Smokeless tobacco: Never   Substance Use Topics    Alcohol use: Yes     Alcohol/week: 2.0 standard drinks of alcohol     Types: 2 Standard drinks or equivalent per week     Social History     Substance and Sexual Activity   Drug Use Yes    Frequency: 4.0 times per week    Types: Marijuana     Allergies   Allergen Reactions    Simvastatin Myalgia    Topiramate Itching and Rash     Current Outpatient Medications   Medication Sig Dispense Refill    Dulera 100-5 mcg/actuation inhaler Inhale 2 puffs 2 times a day.      albuterol 2.5 mg /3 mL (0.083 %) nebulizer solution 3 MILLILITER(S) EVERY 4 HOURS AS NEEDED INHALATION      albuterol 90 mcg/actuation inhaler 2 puffs every 4 hours.      amLODIPine (Norvasc) 5 mg tablet Take 1 tablet (5 mg) by mouth once daily. 90 tablet 1    ascorbic acid (Vitamin C) 500 mg tablet Take by mouth.      azelastine (Astelin) 137 mcg (0.1 %) nasal spray Administer 1 spray into each nostril once daily in the morning. Use in each nostril as directed      azithromycin (Zithromax) 250 mg tablet Take by mouth once daily.      beclomethasone HFA (Qvar RediHaler) 40 mcg/actuation inhaler 2 puff(s), Inhale, bid, # 1 EA, 3 Refill(s), JASIEL, Type: Maintenance, Pharmacy: [facility]/pharmacy #[id number]      budesonide (Pulmicort) 0.5 mg/2 mL nebulizer solution Take 2 mL (0.5 mg) by nebulization once daily as needed.      buPROPion XL (Wellbutrin XL) 150 mg 24 hr tablet Take 1 tablet (150 mg) by mouth once daily. Do not crush, chew, or split. 90 tablet 1    chlorhexidine (Peridex) 0.12 % solution Use as directed. 473 mL 0    cholecalciferol (Vitamin D-3) 125 MCG (5000 UT) capsule Take by mouth once daily.      docosahexaenoic acid/epa (FISH OIL ORAL) Take by mouth.      docusate sodium (Colace) 50 mg capsule Take 1 capsule (50 mg) by mouth 2 times a day.      fluticasone (Flonase) 50 mcg/actuation nasal spray once every 24 hours.       "gabapentin (Neurontin) 100 mg capsule Take 3 capsules (300 mg) by mouth once daily at bedtime.      Lactobacillus acidophilus (PROBIOTIC ACIDOPHILUS ORAL) Take by mouth.      levothyroxine (Synthroid, Levoxyl) 75 mcg tablet TAKE 1 TABLET BY MOUTH EVERY DAY BEFORE BREAKFAST Oral for 90 days 90 tablet 3    lisinopril 20 mg tablet Take 1 tablet (20 mg) by mouth 2 times a day. 180 tablet 1    melatonin 10 mg tablet Take by mouth once daily.      multivitamin with minerals iron-free (Complete MV Adult 50 Plus) as directed Orally      nystatin (Mycostatin) 100,000 unit/gram powder 1 application to affected area Externally Twice a day prn for 90 days      potassium gluconate 500 mg (83 mg) tablet Take by mouth.      pravastatin (Pravachol) 10 mg tablet Take 1 tablet (10 mg) by mouth once daily at bedtime. 90 tablet 3    SOY ISOFLAVONES ORAL Take by mouth.      SUMAtriptan (Imitrex) 20 mg/actuation nasal spray USE 1 SPRAY NASALLY, MAY REPEAT IN 2 HOURS AS NEEDED. MAXIMUM 2 SPRAYS IN 24 HOURS 18 each 3    TURMERIC ORAL Take by mouth.      zinc gluconate 50 mg tablet Take by mouth.       No current facility-administered medications for this visit.     Review of Systems   Musculoskeletal:         See HPI   All other systems reviewed and are negative.    /84   Pulse 67   Temp 36.1 °C (97 °F) (Temporal)   Resp 16   Ht 1.6 m (5' 3\")   Wt 86.6 kg (191 lb)   SpO2 99%   BMI 33.83 kg/m²     Physical Exam  Vitals reviewed.   Constitutional:       Appearance: Normal appearance.   HENT:      Head: Normocephalic and atraumatic.      Mouth/Throat:      Mouth: Mucous membranes are moist.      Pharynx: Oropharynx is clear.   Eyes:      Extraocular Movements: Extraocular movements intact.      Pupils: Pupils are equal, round, and reactive to light.   Cardiovascular:      Rate and Rhythm: Normal rate and regular rhythm.      Heart sounds: Normal heart sounds.   Pulmonary:      Effort: Pulmonary effort is normal.      Breath " sounds: Normal breath sounds.   Abdominal:      General: Bowel sounds are normal.      Palpations: Abdomen is soft.   Musculoskeletal:      Comments: Tenderness with palpation left shoulder.  Mildly limited range of motion left shoulder.    Skin:     General: Skin is warm and dry.   Neurological:      General: No focal deficit present.      Mental Status: She is alert and oriented to person, place, and time.   Psychiatric:         Mood and Affect: Mood normal.         Behavior: Behavior normal.        PAT AIRWAY:   Airway:     Mallampati::  II    TM distance::  >3 FB    Neck ROM::  Full   Teeth intact    ASA:  III  DASI SCORE:  42.7  METS SCORE:  8  CHAD2 SCORE:  2.8%  REVISED CARDIAC RISK INDEX:  0.4%  STOP BANG SCORE:  3  CAPRINI DVT SCORE:  8    MRSA, CBC, BMP ordered during PAT visit    Assessment and Plan:     Left shoulder impingement:  Left shoulder arthroscopy, subacromial decompression  Asthma - using Pulmicort, Dulera and albuterol inhaler as needed  Hypertension - lisinopril  Chronic Kidney Disease - stage II  H/O non-Hodgkin's lymphoma/chronic lymphocytic lymphoma - chemotherapy 2011 - currently stable - followed by Dr. Christopher Alvarenga - [facility]  Post-operative nausea and vomiting    Allyssa Walker PA-C

## 2024-10-16 LAB — STAPHYLOCOCCUS SPEC CULT: NORMAL

## 2024-10-23 ENCOUNTER — ANESTHESIA (OUTPATIENT)
Dept: OPERATING ROOM | Facility: HOSPITAL | Age: 64
End: 2024-10-23
Payer: COMMERCIAL

## 2024-10-23 ENCOUNTER — HOSPITAL ENCOUNTER (OUTPATIENT)
Facility: HOSPITAL | Age: 64
Setting detail: OUTPATIENT SURGERY
Discharge: HOME | End: 2024-10-23
Attending: ORTHOPAEDIC SURGERY | Admitting: ORTHOPAEDIC SURGERY
Payer: COMMERCIAL

## 2024-10-23 ENCOUNTER — ANESTHESIA EVENT (OUTPATIENT)
Dept: OPERATING ROOM | Facility: HOSPITAL | Age: 64
End: 2024-10-23
Payer: COMMERCIAL

## 2024-10-23 VITALS
SYSTOLIC BLOOD PRESSURE: 163 MMHG | RESPIRATION RATE: 16 BRPM | OXYGEN SATURATION: 95 % | WEIGHT: 189.6 LBS | TEMPERATURE: 96.8 F | HEIGHT: 63 IN | BODY MASS INDEX: 33.59 KG/M2 | HEART RATE: 71 BPM | DIASTOLIC BLOOD PRESSURE: 92 MMHG

## 2024-10-23 DIAGNOSIS — M25.812 IMPINGEMENT OF LEFT SHOULDER: ICD-10-CM

## 2024-10-23 DIAGNOSIS — M25.812 SHOULDER IMPINGEMENT, LEFT: ICD-10-CM

## 2024-10-23 DIAGNOSIS — M25.812 SHOULDER IMPINGEMENT, LEFT: Primary | ICD-10-CM

## 2024-10-23 PROCEDURE — 2720000007 HC OR 272 NO HCPCS: Performed by: ORTHOPAEDIC SURGERY

## 2024-10-23 PROCEDURE — 7100000009 HC PHASE TWO TIME - INITIAL BASE CHARGE: Performed by: ORTHOPAEDIC SURGERY

## 2024-10-23 PROCEDURE — 29826 SHO ARTHRS SRG DECOMPRESSION: CPT | Performed by: ORTHOPAEDIC SURGERY

## 2024-10-23 PROCEDURE — 7100000010 HC PHASE TWO TIME - EACH INCREMENTAL 1 MINUTE: Performed by: ORTHOPAEDIC SURGERY

## 2024-10-23 PROCEDURE — 2500000004 HC RX 250 GENERAL PHARMACY W/ HCPCS (ALT 636 FOR OP/ED): Performed by: PHYSICIAN ASSISTANT

## 2024-10-23 PROCEDURE — 2500000004 HC RX 250 GENERAL PHARMACY W/ HCPCS (ALT 636 FOR OP/ED): Performed by: ANESTHESIOLOGY

## 2024-10-23 PROCEDURE — 2500000005 HC RX 250 GENERAL PHARMACY W/O HCPCS: Performed by: ORTHOPAEDIC SURGERY

## 2024-10-23 PROCEDURE — 29823 SHO ARTHRS SRG XTNSV DBRDMT: CPT | Performed by: PHYSICIAN ASSISTANT

## 2024-10-23 PROCEDURE — 3700000002 HC GENERAL ANESTHESIA TIME - EACH INCREMENTAL 1 MINUTE: Performed by: ORTHOPAEDIC SURGERY

## 2024-10-23 PROCEDURE — 7100000002 HC RECOVERY ROOM TIME - EACH INCREMENTAL 1 MINUTE: Performed by: ORTHOPAEDIC SURGERY

## 2024-10-23 PROCEDURE — 3600000008 HC OR TIME - EACH INCREMENTAL 1 MINUTE - PROCEDURE LEVEL THREE: Performed by: ORTHOPAEDIC SURGERY

## 2024-10-23 PROCEDURE — 23430 REPAIR BICEPS TENDON: CPT | Performed by: PHYSICIAN ASSISTANT

## 2024-10-23 PROCEDURE — 2500000004 HC RX 250 GENERAL PHARMACY W/ HCPCS (ALT 636 FOR OP/ED): Performed by: ORTHOPAEDIC SURGERY

## 2024-10-23 PROCEDURE — 3700000001 HC GENERAL ANESTHESIA TIME - INITIAL BASE CHARGE: Performed by: ORTHOPAEDIC SURGERY

## 2024-10-23 PROCEDURE — 29826 SHO ARTHRS SRG DECOMPRESSION: CPT | Performed by: PHYSICIAN ASSISTANT

## 2024-10-23 PROCEDURE — 23430 REPAIR BICEPS TENDON: CPT | Performed by: ORTHOPAEDIC SURGERY

## 2024-10-23 PROCEDURE — 2500000004 HC RX 250 GENERAL PHARMACY W/ HCPCS (ALT 636 FOR OP/ED)

## 2024-10-23 PROCEDURE — 7100000001 HC RECOVERY ROOM TIME - INITIAL BASE CHARGE: Performed by: ORTHOPAEDIC SURGERY

## 2024-10-23 PROCEDURE — 2780000003 HC OR 278 NO HCPCS: Performed by: ORTHOPAEDIC SURGERY

## 2024-10-23 PROCEDURE — A4649 SURGICAL SUPPLIES: HCPCS | Performed by: ORTHOPAEDIC SURGERY

## 2024-10-23 PROCEDURE — 3600000003 HC OR TIME - INITIAL BASE CHARGE - PROCEDURE LEVEL THREE: Performed by: ORTHOPAEDIC SURGERY

## 2024-10-23 PROCEDURE — 29823 SHO ARTHRS SRG XTNSV DBRDMT: CPT | Performed by: ORTHOPAEDIC SURGERY

## 2024-10-23 PROCEDURE — 2500000004 HC RX 250 GENERAL PHARMACY W/ HCPCS (ALT 636 FOR OP/ED): Performed by: NURSE ANESTHETIST, CERTIFIED REGISTERED

## 2024-10-23 PROCEDURE — C1713 ANCHOR/SCREW BN/BN,TIS/BN: HCPCS | Performed by: ORTHOPAEDIC SURGERY

## 2024-10-23 DEVICE — PROXIMAL TENODESIS IMPLANT SYSTEM REV: 0
Type: IMPLANTABLE DEVICE | Site: SHOULDER | Status: FUNCTIONAL
Brand: ARTHREX®

## 2024-10-23 RX ORDER — IBUPROFEN 200 MG
600 TABLET ORAL EVERY 6 HOURS
COMMUNITY

## 2024-10-23 RX ORDER — LABETALOL HYDROCHLORIDE 5 MG/ML
5 INJECTION, SOLUTION INTRAVENOUS ONCE AS NEEDED
Status: DISCONTINUED | OUTPATIENT
Start: 2024-10-23 | End: 2024-10-23 | Stop reason: HOSPADM

## 2024-10-23 RX ORDER — HYDRALAZINE HYDROCHLORIDE 20 MG/ML
5 INJECTION INTRAMUSCULAR; INTRAVENOUS EVERY 30 MIN PRN
Status: DISCONTINUED | OUTPATIENT
Start: 2024-10-23 | End: 2024-10-23 | Stop reason: HOSPADM

## 2024-10-23 RX ORDER — ONDANSETRON HYDROCHLORIDE 2 MG/ML
INJECTION, SOLUTION INTRAVENOUS AS NEEDED
Status: DISCONTINUED | OUTPATIENT
Start: 2024-10-23 | End: 2024-10-23

## 2024-10-23 RX ORDER — SCOLOPAMINE TRANSDERMAL SYSTEM 1 MG/1
1 PATCH, EXTENDED RELEASE TRANSDERMAL ONCE
Status: DISCONTINUED | OUTPATIENT
Start: 2024-10-23 | End: 2024-10-23 | Stop reason: HOSPADM

## 2024-10-23 RX ORDER — MEPERIDINE HYDROCHLORIDE 25 MG/ML
12.5 INJECTION INTRAMUSCULAR; INTRAVENOUS; SUBCUTANEOUS EVERY 10 MIN PRN
Status: DISCONTINUED | OUTPATIENT
Start: 2024-10-23 | End: 2024-10-23 | Stop reason: HOSPADM

## 2024-10-23 RX ORDER — FENTANYL CITRATE 50 UG/ML
INJECTION, SOLUTION INTRAMUSCULAR; INTRAVENOUS AS NEEDED
Status: DISCONTINUED | OUTPATIENT
Start: 2024-10-23 | End: 2024-10-23

## 2024-10-23 RX ORDER — ACETAMINOPHEN 500 MG
1000 TABLET ORAL EVERY 6 HOURS PRN
COMMUNITY

## 2024-10-23 RX ORDER — HYDROCODONE BITARTRATE AND ACETAMINOPHEN 5; 325 MG/1; MG/1
1 TABLET ORAL EVERY 6 HOURS PRN
Qty: 20 TABLET | Refills: 0 | Status: SHIPPED | OUTPATIENT
Start: 2024-10-23 | End: 2024-10-28

## 2024-10-23 RX ORDER — PROPOFOL 10 MG/ML
INJECTION, EMULSION INTRAVENOUS AS NEEDED
Status: DISCONTINUED | OUTPATIENT
Start: 2024-10-23 | End: 2024-10-23

## 2024-10-23 RX ORDER — SODIUM CHLORIDE, SODIUM LACTATE, POTASSIUM CHLORIDE, CALCIUM CHLORIDE 600; 310; 30; 20 MG/100ML; MG/100ML; MG/100ML; MG/100ML
20 INJECTION, SOLUTION INTRAVENOUS CONTINUOUS
Status: DISCONTINUED | OUTPATIENT
Start: 2024-10-23 | End: 2024-10-23 | Stop reason: HOSPADM

## 2024-10-23 RX ORDER — DEXAMETHASONE SODIUM PHOSPHATE 10 MG/ML
INJECTION INTRAMUSCULAR; INTRAVENOUS AS NEEDED
Status: DISCONTINUED | OUTPATIENT
Start: 2024-10-23 | End: 2024-10-23

## 2024-10-23 RX ORDER — METOCLOPRAMIDE HYDROCHLORIDE 5 MG/ML
10 INJECTION INTRAMUSCULAR; INTRAVENOUS ONCE
Status: COMPLETED | OUTPATIENT
Start: 2024-10-23 | End: 2024-10-23

## 2024-10-23 RX ORDER — FAMOTIDINE 10 MG/ML
20 INJECTION INTRAVENOUS ONCE
Status: COMPLETED | OUTPATIENT
Start: 2024-10-23 | End: 2024-10-23

## 2024-10-23 RX ORDER — ONDANSETRON HYDROCHLORIDE 2 MG/ML
4 INJECTION, SOLUTION INTRAVENOUS ONCE AS NEEDED
Status: DISCONTINUED | OUTPATIENT
Start: 2024-10-23 | End: 2024-10-23 | Stop reason: HOSPADM

## 2024-10-23 RX ORDER — CEFAZOLIN 1 G/1
INJECTION, POWDER, FOR SOLUTION INTRAVENOUS AS NEEDED
Status: DISCONTINUED | OUTPATIENT
Start: 2024-10-23 | End: 2024-10-23

## 2024-10-23 RX ORDER — CEFAZOLIN SODIUM 2 G/100ML
2 INJECTION, SOLUTION INTRAVENOUS ONCE
Status: DISCONTINUED | OUTPATIENT
Start: 2024-10-23 | End: 2024-10-23 | Stop reason: HOSPADM

## 2024-10-23 RX ORDER — ROCURONIUM BROMIDE 10 MG/ML
INJECTION, SOLUTION INTRAVENOUS AS NEEDED
Status: DISCONTINUED | OUTPATIENT
Start: 2024-10-23 | End: 2024-10-23

## 2024-10-23 RX ORDER — OXYCODONE AND ACETAMINOPHEN 5; 325 MG/1; MG/1
1 TABLET ORAL EVERY 6 HOURS PRN
Qty: 20 TABLET | Refills: 0 | Status: SHIPPED | OUTPATIENT
Start: 2024-10-23 | End: 2024-10-23 | Stop reason: HOSPADM

## 2024-10-23 RX ORDER — FENTANYL CITRATE 50 UG/ML
50 INJECTION, SOLUTION INTRAMUSCULAR; INTRAVENOUS ONCE
Status: COMPLETED | OUTPATIENT
Start: 2024-10-23 | End: 2024-10-23

## 2024-10-23 RX ORDER — LIDOCAINE HYDROCHLORIDE 10 MG/ML
INJECTION, SOLUTION EPIDURAL; INFILTRATION; INTRACAUDAL; PERINEURAL AS NEEDED
Status: DISCONTINUED | OUTPATIENT
Start: 2024-10-23 | End: 2024-10-23

## 2024-10-23 RX ORDER — BUPIVACAINE HYDROCHLORIDE 5 MG/ML
INJECTION, SOLUTION PERINEURAL AS NEEDED
Status: DISCONTINUED | OUTPATIENT
Start: 2024-10-23 | End: 2024-10-23

## 2024-10-23 RX ORDER — MIDAZOLAM HYDROCHLORIDE 1 MG/ML
2 INJECTION, SOLUTION INTRAMUSCULAR; INTRAVENOUS ONCE
Status: COMPLETED | OUTPATIENT
Start: 2024-10-23 | End: 2024-10-23

## 2024-10-23 SDOH — HEALTH STABILITY: MENTAL HEALTH: CURRENT SMOKER: 1

## 2024-10-23 ASSESSMENT — PAIN - FUNCTIONAL ASSESSMENT
PAIN_FUNCTIONAL_ASSESSMENT: FLACC (FACE, LEGS, ACTIVITY, CRY, CONSOLABILITY)
PAIN_FUNCTIONAL_ASSESSMENT: 0-10

## 2024-10-23 ASSESSMENT — PAIN SCALES - GENERAL
PAIN_LEVEL: 3
PAINLEVEL_OUTOF10: 0 - NO PAIN
PAINLEVEL_OUTOF10: 6

## 2024-10-23 NOTE — ANESTHESIA PROCEDURE NOTES
Peripheral Block    Patient location during procedure: pre-op  Start time: 10/23/2024 11:24 AM  End time: 10/23/2024 11:26 AM  Reason for block: at surgeon's request and post-op pain management  Staffing  Performed: attending   Authorized by: Gerry Atkinson MD    Performed by: Gerry Atkinson MD  Preanesthetic Checklist  Completed: patient identified, IV checked, site marked, risks and benefits discussed, surgical consent, monitors and equipment checked, pre-op evaluation and timeout performed   Timeout performed at: 10/23/2024 11:20 AM  Peripheral Block  Patient position: laying flat  Prep: alcohol swabs  Patient monitoring: heart rate, cardiac monitor and continuous pulse ox  Block type: interscalene  Laterality: left  Injection technique: single-shot  Guidance: nerve stimulator and ultrasound guided  Needle  Needle type: short-bevel   Needle gauge: 22 G  Needle length: 5 cm  Needle localization: anatomical landmarks, ultrasound guidance and nerve stimulator  Needle insertion depth: 4 cm  Assessment  Injection assessment: negative aspiration for heme, no paresthesia on injection, incremental injection and local visualized surrounding nerve on ultrasound  Paresthesia pain: none  Heart rate change: no  Slow fractionated injection: yes  Additional Notes  Dexamethasone 10 mg added to local

## 2024-10-23 NOTE — ANESTHESIA POSTPROCEDURE EVALUATION
Patient: Monica Durham    Procedure Summary       Date: 10/23/24 Room / Location: [facility] / [facility]    Anesthesia Start: 1216 Anesthesia Stop: 1327    Procedure: Decompression Arthroscopy Subacromial/Bicep Tenodesis (Left: Shoulder) Diagnosis:       Shoulder impingement, left      (Shoulder impingement, left [M25.812])    Surgeons: Mayito Mercedes MD Responsible Provider: FEDE Conklin, SUMMER    Anesthesia Type: general, regional ASA Status: 3            Anesthesia Type: general, regional    Vitals Value Taken Time   /84 10/23/24 1349   Temp 36.2 °C (97.2 °F) 10/23/24 1322   Pulse 75 10/23/24 1349   Resp 14 10/23/24 1349   SpO2 97 % 10/23/24 1349       Anesthesia Post Evaluation    Patient location during evaluation: PACU  Patient participation: complete - patient participated  Level of consciousness: awake and alert  Pain score: 3  Pain management: adequate  Multimodal analgesia pain management approach  Airway patency: patent  Two or more strategies used to mitigate risk of obstructive sleep apnea  Cardiovascular status: acceptable and blood pressure returned to baseline  Respiratory status: acceptable  Hydration status: acceptable  Postoperative Nausea and Vomiting: none  Comments: Block appears to be working well.        There were no known notable events for this encounter.

## 2024-10-23 NOTE — ANESTHESIA PROCEDURE NOTES
Airway  Date/Time: 10/23/2024 12:25 PM  Urgency: elective    Airway not difficult    Staffing  Performed: STARR   Authorized by: FEDE Conklin DNP    Performed by: FEDE Conklin DNP  Patient location during procedure: OR    Indications and Patient Condition  Indications for airway management: anesthesia and airway protection  Spontaneous Ventilation: absent  Sedation level: deep  Preoxygenated: yes  Patient position: sniffing  Mask difficulty assessment: 1 - vent by mask    Final Airway Details  Final airway type: endotracheal airway      Successful airway: ETT  Cuffed: yes   Successful intubation technique: direct laryngoscopy  Facilitating devices/methods: intubating stylet  Endotracheal tube insertion site: oral  Blade: Henley  Blade size: #2  ETT size (mm): 6.5  Cormack-Lehane Classification: grade I - full view of glottis  Placement verified by: chest auscultation and capnometry   Measured from: lips  ETT to lips (cm): 21  Number of attempts at approach: 1

## 2024-10-23 NOTE — ANESTHESIA PREPROCEDURE EVALUATION
Patient: Monica Durham    Procedure Information       Date/Time: 10/23/24 1130    Procedure: Decompression Arthroscopy Subacromial ( arthrex ) (Left: Shoulder)    Location: [facility]    Surgeons: Mayito Mercedes MD            Relevant Problems   Cardiac   (+) Essential hypertension   (+) Hypercholesteremia      Pulmonary   (+) Chronic obstructive lung disease (Multi)   (+) Mild persistent asthma      Neuro   (+) Anxiety state   (+) Lumbar radiculopathy   (+) Recurrent major depression in full remission (CMS-HCC)      Endocrine   (+) Hypothyroidism   (+) Obesity      Hematology   (+) Chronic lymphocytic leukemia (Multi)   (+) Lymphoma   (+) Small lymphocytic lymphoma (Multi)      Musculoskeletal   (+) DDD (degenerative disc disease), lumbar   (+) Primary localized osteoarthrosis of shoulder region   (+) Spondylosis without myelopathy or radiculopathy, lumbosacral region      ID   (+) Plantar wart of left foot   (+) Viral wart on finger      Skin   (+) Basal cell carcinoma     Past Surgical History:   Procedure Laterality Date    ABDOMINAL HERNIA REPAIR  2009     SECTION, LOW TRANSVERSE  1992     SECTION, LOW TRANSVERSE  1993    HYSTERECTOMY  2004    LITHOTRIPSY Left 2011    MICRODISCECTOMY LUMBAR  2004    OTHER SURGICAL HISTORY  2015    Colon resection - diverticulosis    OTHER SURGICAL HISTORY  2012    Brachioplasty    ROTATOR CUFF REPAIR Right 2018    TONSILLECTOMY  1966    TOTAL SHOULDER ARTHROPLASTY Right          Clinical information reviewed:   Tobacco  Allergies  Meds   Med Hx  Surg Hx  OB Status  Fam Hx  Soc   Hx        NPO Detail:  NPO/Void Status  Carbohydrate Drink Given Prior to Surgery? : N  Date of Last Liquid: 10/22/24  Time of Last Liquid:   Date of Last Solid: 10/22/24  Time of Last Solid:   Last Intake Type: Clear fluids; Food  Time of Last Void: 1020         Physical Exam    Airway  Mallampati: II  TM distance: >3 FB  Neck ROM: full      Cardiovascular   Comments: deferred   Dental    Pulmonary   Comments: deferred   Abdominal     Comments: deferred           Anesthesia Plan    History of general anesthesia?: yes  History of complications of general anesthesia?: yes    ASA 3     general and regional   (H/O PONV (will pre treat with Reglan and Pepcid and scopolamine patch))  The patient is a current smoker.  Patient was previously instructed to abstain from smoking on day of procedure.  Patient did not smoke on day of procedure.  Education provided regarding risk of obstructive sleep apnea.  intravenous induction   Postoperative administration of opioids is intended.  Anesthetic plan and risks discussed with patient.    Plan discussed with CRNA.

## 2024-10-23 NOTE — OP NOTE
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 grade 2 and 3 glenoid     Procedures  #1 left shoulder open biceps tenodesis  #2 left shoulder arthroscopic major joint debridement labral tear biceps tear with tenotomy with debridement synovitis and chondromalacia  #3 left shoulder arthroscopic anterior acromioplasty with extensive decompression and bursectomy    Surgeons      * Mayito Mercedes - Primary    Resident/Fellow/Other Assistant:  Surgeons and Role:  * No surgeons found with a matching role *  Tosha Saleh PA-C  Procedure Summary  Anesthesia: Regional, General  ASA: III  Anesthesia Staff: Anesthesiologist: Gerry Atkinson MD  CRNA: FEDE Conklin DNP  SRNA: Gustavo Whitaker  Anesthesiologist (Solo in Case): Gerry Atkinson MD  Estimated Blood Loss: 5mL  Intra-op Medications:   Administrations occurring from 1130 to 1315 on 10/23/24:   Medication Name Total Dose   EPINEPHrine (Adrenalin) 2 mg in sodium chloride 0.9 % 3,000 mL irrigation 3,000 mL   ceFAZolin (Ancef) vial 1 g 2 g   dexAMETHasone (Decadron) injection 4 mg/mL 8 mg   fentaNYL (Sublimaze) injection 50 mcg/mL 100 mcg   lactated Ringer's infusion Cannot be calculated   lidocaine PF (Xylocaine-MPF) local injection 1 % 50 mg   ondansetron (Zofran) 2 mg/mL injection 4 mg   propofol (Diprivan) injection 10 mg/mL 200 mg   rocuronium (ZeMuron) 50 mg/5 mL injection 50 mg              Anesthesia Record               Intraprocedure I/O Totals       None           Specimen: No specimens collected     Staff:    Circulator: Marybeth  Scrub Person: Guille  Scrub Person: Allyssa  Natividad Circulator: Courtney         Drains and/or Catheters: * None in log *    Tourniquet Times:         Implants:  Implants       Type Name Action Serial No.      Implant BUTTON, 2.6 FIBERTAK, W/  AND DBL LOADED FIBERLINK - [id number] Wasted      Implant KIT, IMPLANT SYSTEM, PROXIMAL TENODESIS - [id number] Implanted               Findings: As above    Indications: Monica Durham is an 63 y.o. female who is having surgery for Shoulder impingement, left [M25.812].  Patient understands risk benefits alternatives such as nerve artery tendon damage infection continued pain possibly for future surgery and especially as discussed with the family she could need a replacement in the future due to the arthritic change.  Hopefully she will get improvement with all the findings seen at surgery today.  Wish to proceed informed consent obtained    The patient was seen in the preoperative area. The risks, benefits, complications, treatment options, non-operative alternatives, expected recovery and outcomes were discussed with the patient. The possibilities of reaction to medication, pulmonary aspiration, injury to surrounding structures, bleeding, recurrent infection, the need for additional procedures, failure to diagnose a condition, and creating a complication requiring transfusion or operation were discussed with the patient. The patient concurred with the proposed plan, giving informed consent.  The site of surgery was properly noted/marked if necessary per policy. The patient has been actively warmed in preoperative area. Preoperative antibiotics have been ordered and given within 1 hours of incision. Venous thrombosis prophylaxis have been ordered including bilateral sequential compression devices    Procedure Details: Pleasant patient brought the operating room after sterilely prepping draping and performing a timeout we placed the patient in right  lateral decubitus position well-padded on beanbag with axillary roll no adhesive capsulitis was seen clinically easily entered the joint immediately saw the grade III and IV chondromalacia of the humeral head with 2 and 3 of the glenoid large labral tear going up to a SLAP tear into the biceps with tenosynovitis we tenotomized the biceps for later tenodesis cleaned out the interval of synovitis some mild subscapular fraying and some mild fraying of the undersurface of the cuff that was debrided but no significant tear less than 10%    At this point placed the arthroscope in the subacromial space and here we again found abundant bursitis and impinging lesion anteriorly and we performed an anterior chromium plasty extensive decompression bursectomy no significant rotator cuff tear from the bursal side we copious irrigated closed the portals 3 cm subpectoral incision performed and easily harvesting the biceps tendon performed unicortical biceps tenodesis under appropriate tension copious irrigated closed wounds patient was placed in abduction pillow sling no complications we will start in therapy and 2 days.  Tosha Saleh PA-C acted as surgical assistant during this case and her assistance greatly reduced operative time and aided in performance of the case  Complications:  None; patient tolerated the procedure well.    Disposition: PACU - hemodynamically stable.  Condition: stable         Additional Details: 0    Attending Attestation: I performed the procedure.    Mayito Mercedes  Phone Number: [phone]

## 2024-10-24 ASSESSMENT — PAIN SCALES - GENERAL
PAINLEVEL_OUTOF10: 0 - NO PAIN
PAINLEVEL_OUTOF10: 0 - NO PAIN

## 2024-10-25 ENCOUNTER — EVALUATION (OUTPATIENT)
Dept: OCCUPATIONAL THERAPY | Facility: CLINIC | Age: 64
End: 2024-10-25
Payer: COMMERCIAL

## 2024-10-25 DIAGNOSIS — M25.812 SHOULDER IMPINGEMENT, LEFT: ICD-10-CM

## 2024-10-25 PROCEDURE — 97535 SELF CARE MNGMENT TRAINING: CPT | Mod: GO | Performed by: OCCUPATIONAL THERAPIST

## 2024-10-25 PROCEDURE — 97110 THERAPEUTIC EXERCISES: CPT | Mod: GO | Performed by: OCCUPATIONAL THERAPIST

## 2024-10-25 PROCEDURE — 97165 OT EVAL LOW COMPLEX 30 MIN: CPT | Mod: GO | Performed by: OCCUPATIONAL THERAPIST

## 2024-10-25 SDOH — ECONOMIC STABILITY: GENERAL: QUALITY OF LIFE: GOOD

## 2024-10-25 ASSESSMENT — ENCOUNTER SYMPTOMS
PAIN SCALE AT HIGHEST: 10
PAIN SCALE AT LOWEST: 2
PAIN SCALE: 7
PAIN LOCATION: L SHOULDER
QUALITY: SHARP

## 2024-10-25 ASSESSMENT — ACTIVITIES OF DAILY LIVING (ADL): HOME_MANAGEMENT_TIME_ENTRY: 10

## 2024-10-25 NOTE — PROGRESS NOTES
"Evaluation/Treatment    Patient Name: Monica Durham  MRN: [id number]  : 1960  Today's Date: 10/25/24    Time Calculation  Start Time: 0845  Stop Time: 930  Time Calculation (min): 45 min  OT Evaluation Time Entry  OT Evaluation (Low) Time Entry: 15  OT Therapeutic Procedures Time Entry  Self Care/Home Management (ADLs) Time Entry: 10  Therapeutic Exercise Time Entry: 20      Subjective   Current Problem/Diagnosis:  1. Shoulder impingement, left [M25.812]  Referral to Occupational Therapy    Follow Up In Occupational Therapy        Subjective Evaluation    History of Present Illness  Date of surgery: 10/23/2024  Mechanism of injury: Patient is a 63-year-old female who presents with several year history of B shoulder injuries; after several courses of Cortisone injections in L shoulder patient underwent Left biceps tenodesis, left shoulder arthroscopic major joint debridement labral tear biceps tear with tenotomy with debridement synovitis and chondromalacia, and arthroscopic anterior acromioplasty with extensive decompression and bursectomy on 10/23/24.    Of note, patient with Non-Hodgkin's Lymphoma.         Quality of life: good    Pain  Current pain ratin  At best pain ratin  At worst pain rating: 10  Location: L shoulder  Quality: sharp    Social Support  Lives with: spouse    Hand dominance: right    Diagnostic Tests  MRI studies: abnormal    Patient Goals  Patient goals for therapy: decreased edema, decreased pain, increased motion, increased strength, independence with ADLs/IADLs and return to sport/leisure activities  Patient goal: \"I want to be able to hold my 2 month old granddaughter.\"       Precautions:   0-2 weeks AAROM FA/elbow motion  2+ weeks AROM FA/elbow  0-8 weeks: Regain full PROM/AROM of shoulder  8+ weeks: Strengthening    Objective     Prior Functional Status: Fully Independent     Work History:     Occupation and Activities:  Work status: retired  Current functional " limitations: Grooming, Bathing, Dressing, Lifting, Holding, Crafts/hobbies, Gardening, Tool handling, Driving, and yoga, gardening, babysitting grandchildren.       Objective     Sensation: Denies abnormal    Appearance: +Ecchymosis throughout proximal LUE; +3 arthroscopy sites with sutures intact and ~2 cm incision at biceps with sutures intact; slight bloody drainge noted at biceps surgical site    Edema: +Moderate to shoulder capsule and at biceps insertion    Coordination: +Impaired      Range of Motion/Strength:     Upper Extremity ROM    AROM PROM MMT MMT     Right Left Right Left Right Left   SHOULDER Flexion WFL NT  WFL NT    Extension WFL NT WFL Neutral WFL NT    Abduction WFL NT  WFL NT    Internal Rotation WFL NT WFL To stomach WFL NT    External Rotation WFL NT WFL 45 WFL NT   **LUE AAROM  ELBOW Extension WFL -25 WFL 0 WFL NT    Flexion   WFL NT     FOREARM Pronation WFL 90 WFL WFL WFL NT    Supination WFL 75 WFL WFL WFL NT     WRIST Flexion WFL WFL WFL WFL WFL NT    Extension WFL WFL WFL WFL WFL NT    Radial Deviation WFL WFL WFL WFL      Ulnar Deviation WFL WFL WFL WFL       Hand Range of Motion   All digits R hand WFL.    Hand Strength   (lbs) Right Left   1     2 NT NT   3     4     5       Pinch Right Left   2-pt NT NT   3-pt NT NT   Lateral NT NT       Outcome Measure: 22/80; 73% impaired    Splinting: Patient in to clinic with LUE sling and abduction pillow intact; somewhat jumbled; therapist reconstructed strapping and pillow for improved fit and comfort. Instructed patient in don/doff process for greater ease and management.    Modalities: Therapist educated patient on hot/cold modality use for pain management and pre/post-HEP.    Therapy/Activity: Therapist debulked surgical dressing and completed skin hygiene. Therapist educated patient on surgical precautions and functional implications. Therapist initiated ADL retraining with instruction for compensatory  "tech's and task modifications to enhance ease of task components and effectively manage pain. Therapist provided demonstration with verbal instruction for AROM L hand composite grasp/release, opposition, wrist flexion/extension, RD/UD and AAROM FA sup/pro and elbow flexion/extension x10 reps each; written handout issued; HEP established. Therapist provided demonstration with verbal instruction for scapular mobilization including elevation/depression, protraction/retraction, and CW/CCW circumduction x10 reps each, as well as, pendulums FW/BW, side-to-side, and CW/CCW x10 reps each; written handout issued. Therapist facilitated supine PROM for L shoulder flexion, scaption, abduction, and ER/IR; 10 rep each.    EDUCATION: See above.       Assessment & Plan     Assessment  Impairments: abnormal coordination, abnormal or restricted ROM, activity intolerance, impaired physical strength, lacks appropriate home exercise program, pain with function and weight-bearing intolerance  Assessment details: Patient is a 63-year-old female who presents s/p L shoulder decompression and biceps tenodesis with subsequent post-op pain, edema, impaired ROM, impaired coordination, weakness, and impaired functional use of LUE. Skilled OT intervention indicated to address deficits and facilitate return to PLOF.    Low complexity evaluation selected due to patient's clinical presentation, medical stability, and condition uncomplicated by existing co-morbidities that may affect patient's rehab tolerance, progression, and potential.   Prognosis: good    Goals  \"I want to be able to hold my 2 month old granddaughter.\"    Plan  Planned modality interventions: thermotherapy (hydrocollator packs), TENS and ultrasound  Planned therapy interventions: ADL retraining, flexibility, functional ROM exercises, home exercise program, IADL retraining, manual therapy, motor coordination training, neuromuscular re-education, soft tissue mobilization, " strengthening, stretching and therapeutic activities  Frequency: 1x week  Duration in visits: 12  Treatment plan discussed with: patient  Plan details: [facility]; 40 visits/yr           Goals:  Active       OT Goals       1. Patient will achieve AROM L elbow extension to 0 and flexion to 140 for improved functional use of LUE with ADL's.       Start:  10/25/24    Expected End:  12/20/24            2. Patient will increase PROM L shoulder flexion and abduction to 160 degrees with reports of <2/10 pain.       Start:  10/25/24    Expected End:  12/20/24            3. Patient will increase PROM L shoulder ER to 60 degrees with reports of <2/10 pain.       Start:  10/25/24    Expected End:  12/20/24            4. Patient will increase L shoulder AROM flexion/abduction to 140 degrees and ER to 60 degrees for improved functional use of LUE with ADL's/IADL's.       Start:  10/25/24    Expected End:  12/20/24            5. Patient will increase L shoulder strength to 4/5 all planes for improved functional use of LUE With ADL's/IADL's.       Start:  10/25/24    Expected End:  01/17/25            6. Patient will increase overall functional ease and independence AEB UEFI score of at least 60/80 for improved quality of life for the patient.       Start:  10/25/24    Expected End:  01/17/25

## 2024-10-28 ENCOUNTER — APPOINTMENT (OUTPATIENT)
Dept: DERMATOLOGY | Facility: CLINIC | Age: 64
End: 2024-10-28
Payer: COMMERCIAL

## 2024-11-04 ENCOUNTER — APPOINTMENT (OUTPATIENT)
Dept: OCCUPATIONAL THERAPY | Facility: CLINIC | Age: 64
End: 2024-11-04
Payer: COMMERCIAL

## 2024-11-04 ENCOUNTER — APPOINTMENT (OUTPATIENT)
Dept: ORTHOPEDIC SURGERY | Facility: CLINIC | Age: 64
End: 2024-11-04
Payer: COMMERCIAL

## 2024-11-04 DIAGNOSIS — M25.812 SHOULDER IMPINGEMENT, LEFT: Primary | ICD-10-CM

## 2024-11-04 PROCEDURE — 99024 POSTOP FOLLOW-UP VISIT: CPT | Performed by: ORTHOPAEDIC SURGERY

## 2024-11-04 ASSESSMENT — PAIN SCALES - GENERAL: PAINLEVEL_OUTOF10: 3

## 2024-11-04 ASSESSMENT — PAIN - FUNCTIONAL ASSESSMENT: PAIN_FUNCTIONAL_ASSESSMENT: 0-10

## 2024-11-04 NOTE — PROGRESS NOTES
Reason for Appointment  Chief Complaint   Patient presents with    Left Shoulder - Post-op     History of Present Illness  Patient is here today 2 weeks s/p a left shoulder arthroscopic decompression and biceps tenodesis on 10/23/24. Patient is doing well overall.  Wounds are healing nicely with no signs of infection sutures were removed today.  She already has good shoulder motion, she has been to physical therapy.  She will continue to work on shoulder motion and stretching and she understands with some of the arthritis seen intraoperatively she may need a shoulder replacement in the future, she has had a reverse shoulder replacement done on her other side and has done well.  She understands no heavy lifting with this arm and we went over her intraoperative pictures.  She can follow-up with us in 6 weeks.    Assessment   Left shoulder impingement      At the time of surgery in this patient under arthroscopy the operative note clearly documents along with the intraoperative photographs of the grade III and IV chondromalacia of the humeral head with a large glenoid labral tear with bicipital tenosynovitis and some fraying of the upper subscapularis and the debridement of these structures coincides with the code of major arthroscopic joint debridement.  In addition preoperatively and the day of surgery the patient had tenderness over the biceps and with the inflammation and partial tearing of the biceps with a SLAP type tear that was again documented in the operative note and on intraoperative photographs, this necessitated the tenotomy and subsequent biceps tenodesis.  These procedures were needed to fully treat the patient's symptoms that were found at the time of surgery and need to be covered.

## 2024-11-04 NOTE — PROGRESS NOTES
Occupational Therapy                 Therapy Communication Note    Patient Name: Monica Durham  MRN: [id number]  Department:   Room: Room/bed info not found  Today's Date: 11/4/2024     Discipline: Occupational Therapy    Missed Time: Cancel    Comment:Patient cancelled through centralized scheduling. Scheduling note stated that therapy is no longer needed. To reach out to Dr. Mercedes's office after patient's visit there scheduled for today for more information.

## 2024-11-13 ENCOUNTER — APPOINTMENT (OUTPATIENT)
Dept: OCCUPATIONAL THERAPY | Facility: CLINIC | Age: 64
End: 2024-11-13
Payer: COMMERCIAL

## 2024-11-15 ENCOUNTER — APPOINTMENT (OUTPATIENT)
Dept: DERMATOLOGY | Facility: CLINIC | Age: 64
End: 2024-11-15
Payer: COMMERCIAL

## 2024-11-21 DIAGNOSIS — I10 ESSENTIAL HYPERTENSION: ICD-10-CM

## 2024-11-22 ENCOUNTER — APPOINTMENT (OUTPATIENT)
Dept: DERMATOLOGY | Facility: CLINIC | Age: 64
End: 2024-11-22
Payer: COMMERCIAL

## 2024-11-22 RX ORDER — LISINOPRIL 20 MG/1
20 TABLET ORAL 2 TIMES DAILY
Qty: 180 TABLET | Refills: 0 | Status: SHIPPED | OUTPATIENT
Start: 2024-11-22

## 2024-11-27 ENCOUNTER — APPOINTMENT (OUTPATIENT)
Dept: ORTHOPEDIC SURGERY | Facility: CLINIC | Age: 64
End: 2024-11-27
Payer: COMMERCIAL

## 2024-12-04 ENCOUNTER — APPOINTMENT (OUTPATIENT)
Dept: DERMATOLOGY | Facility: CLINIC | Age: 64
End: 2024-12-04
Payer: COMMERCIAL

## 2024-12-11 ENCOUNTER — APPOINTMENT (OUTPATIENT)
Dept: ORTHOPEDIC SURGERY | Facility: CLINIC | Age: 64
End: 2024-12-11
Payer: COMMERCIAL

## 2024-12-11 DIAGNOSIS — M25.551 RIGHT HIP PAIN: Primary | ICD-10-CM

## 2024-12-11 PROCEDURE — 99214 OFFICE O/P EST MOD 30 MIN: CPT | Performed by: ORTHOPAEDIC SURGERY

## 2024-12-11 RX ORDER — MELOXICAM 15 MG/1
15 TABLET ORAL DAILY
Qty: 60 TABLET | Refills: 0 | Status: SHIPPED | OUTPATIENT
Start: 2024-12-11 | End: 2025-02-09

## 2024-12-11 ASSESSMENT — PAIN - FUNCTIONAL ASSESSMENT: PAIN_FUNCTIONAL_ASSESSMENT: 0-10

## 2024-12-11 ASSESSMENT — PAIN SCALES - GENERAL: PAINLEVEL_OUTOF10: 4

## 2024-12-11 NOTE — PROGRESS NOTES
This is a consultation from Dr. Ilana Puri DO for   Chief Complaint   Patient presents with    Right Hip - Pain       This is a 64 y.o. female who presents for follow-up for her right hip.  Patient has right hip arthritis, she had a cortisone injection in May of this year.  It gave her good relief and she lasted for nearly 5 months.  Since then she has had return of her symptoms estimation of her pain, sharp pain over the anterior and medial hip into the groin.  Also over the lateral hip.  No numbness or tingling no fevers or chills no shooting pain down the leg.    Physical Exam    There has been no interval change in this patient's past medical, surgical, medications, allergies, family history or social history since the most recent visit to a provider within our department. 14 point review of systems was performed, reviewed, and negative except for pertinent positives documented in the history of present illness.     Constitutional: well developed, well nourished female in no acute distress  Psychiatric: normal mood, appropriate affect  Eyes: sclera anicteric  HENT: normocephalic/atraumatic  CV: regular rate and rhythm   Respiratory: non labored breathing  Integumentary: no rash  Neurological: moves all extremities    Right hip exam: skin normal, no abrasions, wounds, or lacerations. nttp over greater trochanter. negative log roll, negative makayla's test. flexion to 90 degrees without pain. no pain with flexion abduction and external rotation, reproduction of hip and groin pain with flexion adduction and internal rotation. neurovascularly intact distally        Procedures      Impression/Plan: This is a 64 y.o. female with right hip arthritis.  I had an in depth discussion with the patient regarding treatment options for arthritis and their relative risks and benefits. We reviewed surgical and nonsurgical option for treatment. Treatments include anti inflammatory medications, physical therapy, weight loss,  activity modification, use of assistive devices, injection therapies. We discussed current prescriptions and risks and benefits of continuation of prescription medication as apporpriate. We discussed that arthritis is often progressive over time, an in end stage arthritis surgical interventions can be considered, including arthroplasty. All questions were answered and the patient voiced their understanding.  Will get her set up with a guided cortisone injection once again as well as a prescription anti-inflammatory.  I discussed the appropriate use of this medication with her today as well as the risks and benefits of it.  Will see her back as needed.  Discussed getting a new x-ray in the future.    BMI Readings from Last 1 Encounters:   10/23/24 33.59 kg/m²      Lab Results   Component Value Date    CREATININE 0.79 10/14/2024     Tobacco Use: High Risk (12/11/2024)    Patient History     Smoking Tobacco Use: Some Days     Smokeless Tobacco Use: Never     Passive Exposure: Not on file      Computed MELD 3.0 unavailable. One or more values for this score either were not found within the given timeframe or did not fit some other criterion.  Computed MELD-Na unavailable. One or more values for this score either were not found within the given timeframe or did not fit some other criterion.       Lab Results   Component Value Date    HGBA1C 5.2 11/29/2022     Lab Results   Component Value Date    STAPHMRSASCR No Staphylococcus aureus isolated 10/14/2024

## 2024-12-16 ENCOUNTER — APPOINTMENT (OUTPATIENT)
Dept: PRIMARY CARE | Facility: CLINIC | Age: 64
End: 2024-12-16
Payer: COMMERCIAL

## 2024-12-16 ENCOUNTER — LAB (OUTPATIENT)
Dept: LAB | Facility: LAB | Age: 64
End: 2024-12-16
Payer: COMMERCIAL

## 2024-12-16 ENCOUNTER — APPOINTMENT (OUTPATIENT)
Dept: ORTHOPEDIC SURGERY | Facility: CLINIC | Age: 64
End: 2024-12-16
Payer: COMMERCIAL

## 2024-12-16 DIAGNOSIS — E03.9 HYPOTHYROIDISM, UNSPECIFIED TYPE: ICD-10-CM

## 2024-12-16 DIAGNOSIS — R73.01 IFG (IMPAIRED FASTING GLUCOSE): ICD-10-CM

## 2024-12-16 DIAGNOSIS — I10 ESSENTIAL HYPERTENSION: ICD-10-CM

## 2024-12-16 DIAGNOSIS — E78.00 HYPERCHOLESTEROLEMIA: ICD-10-CM

## 2024-12-16 DIAGNOSIS — Z00.00 WELL ADULT EXAM: ICD-10-CM

## 2024-12-16 LAB
ALBUMIN SERPL BCP-MCNC: 3.8 G/DL (ref 3.4–5)
ALP SERPL-CCNC: 59 U/L (ref 33–136)
ALT SERPL W P-5'-P-CCNC: 20 U/L (ref 7–45)
ANION GAP SERPL CALCULATED.3IONS-SCNC: 11 MMOL/L (ref 10–20)
APPEARANCE UR: CLEAR
AST SERPL W P-5'-P-CCNC: 22 U/L (ref 9–39)
BASOPHILS # BLD AUTO: 0.06 X10*3/UL (ref 0–0.1)
BASOPHILS NFR BLD AUTO: 0.8 %
BILIRUB SERPL-MCNC: 0.4 MG/DL (ref 0–1.2)
BILIRUB UR STRIP.AUTO-MCNC: NEGATIVE MG/DL
BUN SERPL-MCNC: 22 MG/DL (ref 6–23)
CALCIUM SERPL-MCNC: 8.6 MG/DL (ref 8.6–10.3)
CHLORIDE SERPL-SCNC: 107 MMOL/L (ref 98–107)
CHOLEST SERPL-MCNC: 265 MG/DL (ref 0–199)
CHOLEST/HDLC SERPL: 6 {RATIO}
CO2 SERPL-SCNC: 25 MMOL/L (ref 21–32)
COLOR UR: NORMAL
CREAT SERPL-MCNC: 0.89 MG/DL (ref 0.5–1.05)
CREAT UR-MCNC: 74.7 MG/DL (ref 20–320)
EGFRCR SERPLBLD CKD-EPI 2021: 73 ML/MIN/1.73M*2
EOSINOPHIL # BLD AUTO: 0.13 X10*3/UL (ref 0–0.7)
EOSINOPHIL NFR BLD AUTO: 1.8 %
ERYTHROCYTE [DISTWIDTH] IN BLOOD BY AUTOMATED COUNT: 12.9 % (ref 11.5–14.5)
EST. AVERAGE GLUCOSE BLD GHB EST-MCNC: 111 MG/DL
GLUCOSE SERPL-MCNC: 93 MG/DL (ref 74–99)
GLUCOSE UR STRIP.AUTO-MCNC: NORMAL MG/DL
HBA1C MFR BLD: 5.5 %
HCT VFR BLD AUTO: 49 % (ref 36–46)
HDLC SERPL-MCNC: 44.3 MG/DL
HGB BLD-MCNC: 15.6 G/DL (ref 12–16)
IMM GRANULOCYTES # BLD AUTO: 0.04 X10*3/UL (ref 0–0.7)
IMM GRANULOCYTES NFR BLD AUTO: 0.5 % (ref 0–0.9)
KETONES UR STRIP.AUTO-MCNC: NEGATIVE MG/DL
LDLC SERPL CALC-MCNC: 187 MG/DL
LEUKOCYTE ESTERASE UR QL STRIP.AUTO: NEGATIVE
LYMPHOCYTES # BLD AUTO: 3.53 X10*3/UL (ref 1.2–4.8)
LYMPHOCYTES NFR BLD AUTO: 48.2 %
MCH RBC QN AUTO: 30.5 PG (ref 26–34)
MCHC RBC AUTO-ENTMCNC: 31.8 G/DL (ref 32–36)
MCV RBC AUTO: 96 FL (ref 80–100)
MICROALBUMIN UR-MCNC: 7.5 MG/L
MICROALBUMIN/CREAT UR: 10 UG/MG CREAT
MONOCYTES # BLD AUTO: 0.95 X10*3/UL (ref 0.1–1)
MONOCYTES NFR BLD AUTO: 13 %
NEUTROPHILS # BLD AUTO: 2.61 X10*3/UL (ref 1.2–7.7)
NEUTROPHILS NFR BLD AUTO: 35.7 %
NITRITE UR QL STRIP.AUTO: NEGATIVE
NON HDL CHOLESTEROL: 221 MG/DL (ref 0–149)
NRBC BLD-RTO: 0 /100 WBCS (ref 0–0)
PH UR STRIP.AUTO: 5.5 [PH]
PLATELET # BLD AUTO: 217 X10*3/UL (ref 150–450)
POTASSIUM SERPL-SCNC: 4.5 MMOL/L (ref 3.5–5.3)
PROT SERPL-MCNC: 7.3 G/DL (ref 6.4–8.2)
PROT UR STRIP.AUTO-MCNC: NEGATIVE MG/DL
RBC # BLD AUTO: 5.12 X10*6/UL (ref 4–5.2)
RBC # UR STRIP.AUTO: NEGATIVE /UL
SODIUM SERPL-SCNC: 138 MMOL/L (ref 136–145)
SP GR UR STRIP.AUTO: 1.02
TRIGL SERPL-MCNC: 170 MG/DL (ref 0–149)
TSH SERPL-ACNC: 1.81 MIU/L (ref 0.44–3.98)
UROBILINOGEN UR STRIP.AUTO-MCNC: NORMAL MG/DL
VLDL: 34 MG/DL (ref 0–40)
WBC # BLD AUTO: 7.3 X10*3/UL (ref 4.4–11.3)

## 2024-12-16 PROCEDURE — 82570 ASSAY OF URINE CREATININE: CPT

## 2024-12-16 PROCEDURE — 85025 COMPLETE CBC W/AUTO DIFF WBC: CPT

## 2024-12-16 PROCEDURE — 81003 URINALYSIS AUTO W/O SCOPE: CPT

## 2024-12-16 PROCEDURE — 80061 LIPID PANEL: CPT

## 2024-12-16 PROCEDURE — 80053 COMPREHEN METABOLIC PANEL: CPT

## 2024-12-16 PROCEDURE — 83036 HEMOGLOBIN GLYCOSYLATED A1C: CPT

## 2024-12-16 PROCEDURE — 84443 ASSAY THYROID STIM HORMONE: CPT

## 2024-12-16 PROCEDURE — 82043 UR ALBUMIN QUANTITATIVE: CPT

## 2024-12-16 PROCEDURE — 36415 COLL VENOUS BLD VENIPUNCTURE: CPT

## 2024-12-19 ENCOUNTER — HOSPITAL ENCOUNTER (OUTPATIENT)
Dept: RADIOLOGY | Facility: HOSPITAL | Age: 64
Discharge: HOME | End: 2024-12-19
Payer: COMMERCIAL

## 2024-12-19 DIAGNOSIS — M25.551 RIGHT HIP PAIN: ICD-10-CM

## 2024-12-19 PROCEDURE — 96372 THER/PROPH/DIAG INJ SC/IM: CPT | Performed by: STUDENT IN AN ORGANIZED HEALTH CARE EDUCATION/TRAINING PROGRAM

## 2024-12-19 PROCEDURE — 20610 DRAIN/INJ JOINT/BURSA W/O US: CPT | Mod: RT

## 2024-12-19 PROCEDURE — 2550000001 HC RX 255 CONTRASTS: Performed by: ORTHOPAEDIC SURGERY

## 2024-12-19 PROCEDURE — 77002 NEEDLE LOCALIZATION BY XRAY: CPT | Mod: RIGHT SIDE | Performed by: STUDENT IN AN ORGANIZED HEALTH CARE EDUCATION/TRAINING PROGRAM

## 2024-12-19 PROCEDURE — 20610 DRAIN/INJ JOINT/BURSA W/O US: CPT | Mod: RIGHT SIDE | Performed by: STUDENT IN AN ORGANIZED HEALTH CARE EDUCATION/TRAINING PROGRAM

## 2024-12-19 PROCEDURE — 2500000004 HC RX 250 GENERAL PHARMACY W/ HCPCS (ALT 636 FOR OP/ED): Performed by: STUDENT IN AN ORGANIZED HEALTH CARE EDUCATION/TRAINING PROGRAM

## 2024-12-19 RX ORDER — TRIAMCINOLONE ACETONIDE 40 MG/ML
INJECTION, SUSPENSION INTRA-ARTICULAR; INTRAMUSCULAR
Status: COMPLETED | OUTPATIENT
Start: 2024-12-19 | End: 2024-12-19

## 2024-12-19 RX ORDER — BUPIVACAINE HYDROCHLORIDE 2.5 MG/ML
INJECTION, SOLUTION EPIDURAL; INFILTRATION; INTRACAUDAL
Status: COMPLETED | OUTPATIENT
Start: 2024-12-19 | End: 2024-12-19

## 2024-12-19 RX ORDER — LIDOCAINE HYDROCHLORIDE 20 MG/ML
5 INJECTION, SOLUTION INFILTRATION; PERINEURAL ONCE
Status: DISCONTINUED | OUTPATIENT
Start: 2024-12-19 | End: 2024-12-20 | Stop reason: HOSPADM

## 2024-12-19 NOTE — PRE-PROCEDURE NOTE
Interventional Radiology Preprocedure Note    Indication for procedure: The encounter diagnosis was Right hip pain.    Relevant review of systems: NA    Relevant Labs:   Lab Results   Component Value Date    CREATININE 0.89 12/16/2024    EGFR 73 12/16/2024       Planned Sedation/Anesthesia: Local    Airway assessment: N/A    Directed physical examination:    N/A    Mallampati: N/A    ASA Score: ASA 1 - Normal health patient    Benefits, risks and alternatives of procedure and planned sedation have been discussed with the patient and/or their representative. All questions answered and they agree to proceed.

## 2024-12-20 DIAGNOSIS — E03.9 HYPOTHYROIDISM, UNSPECIFIED TYPE: ICD-10-CM

## 2024-12-20 DIAGNOSIS — F33.0 MILD EPISODE OF RECURRENT MAJOR DEPRESSIVE DISORDER (CMS-HCC): ICD-10-CM

## 2024-12-20 RX ORDER — LEVOTHYROXINE SODIUM 75 UG/1
TABLET ORAL
Qty: 90 TABLET | Refills: 0 | Status: SHIPPED | OUTPATIENT
Start: 2024-12-20

## 2024-12-24 RX ORDER — BUPROPION HYDROCHLORIDE 150 MG/1
150 TABLET ORAL DAILY
Qty: 90 TABLET | Refills: 0 | Status: SHIPPED | OUTPATIENT
Start: 2024-12-24

## 2024-12-26 ENCOUNTER — TELEPHONE (OUTPATIENT)
Dept: PRIMARY CARE | Facility: CLINIC | Age: 64
End: 2024-12-26

## 2024-12-26 ENCOUNTER — APPOINTMENT (OUTPATIENT)
Dept: PRIMARY CARE | Facility: CLINIC | Age: 64
End: 2024-12-26
Payer: COMMERCIAL

## 2024-12-26 VITALS
DIASTOLIC BLOOD PRESSURE: 83 MMHG | SYSTOLIC BLOOD PRESSURE: 127 MMHG | OXYGEN SATURATION: 96 % | BODY MASS INDEX: 34.38 KG/M2 | WEIGHT: 194 LBS | HEIGHT: 63 IN | HEART RATE: 68 BPM

## 2024-12-26 DIAGNOSIS — I10 ESSENTIAL HYPERTENSION: ICD-10-CM

## 2024-12-26 DIAGNOSIS — E78.00 HYPERCHOLESTEREMIA: ICD-10-CM

## 2024-12-26 DIAGNOSIS — E78.00 HYPERCHOLESTEREMIA: Primary | ICD-10-CM

## 2024-12-26 DIAGNOSIS — E03.9 HYPOTHYROIDISM, UNSPECIFIED TYPE: ICD-10-CM

## 2024-12-26 DIAGNOSIS — E78.00 HYPERCHOLESTEROLEMIA: ICD-10-CM

## 2024-12-26 DIAGNOSIS — Z00.00 ANNUAL PHYSICAL EXAM: Primary | ICD-10-CM

## 2024-12-26 DIAGNOSIS — D84.9 IMMUNOLOGIC DEFICIENCY SYNDROME (MULTI): ICD-10-CM

## 2024-12-26 DIAGNOSIS — F33.0 MILD EPISODE OF RECURRENT MAJOR DEPRESSIVE DISORDER (CMS-HCC): ICD-10-CM

## 2024-12-26 DIAGNOSIS — Z12.31 ENCOUNTER FOR SCREENING MAMMOGRAM FOR BREAST CANCER: ICD-10-CM

## 2024-12-26 DIAGNOSIS — Z78.0 ASYMPTOMATIC POSTMENOPAUSAL STATE: ICD-10-CM

## 2024-12-26 DIAGNOSIS — G43.009 MIGRAINE WITHOUT AURA AND WITHOUT STATUS MIGRAINOSUS, NOT INTRACTABLE: ICD-10-CM

## 2024-12-26 PROBLEM — J44.9 CHRONIC OBSTRUCTIVE LUNG DISEASE (MULTI): Status: RESOLVED | Noted: 2023-12-06 | Resolved: 2024-12-26

## 2024-12-26 PROBLEM — B07.9 VIRAL WART ON FINGER: Status: RESOLVED | Noted: 2022-12-10 | Resolved: 2024-12-26

## 2024-12-26 PROBLEM — R73.01 IFG (IMPAIRED FASTING GLUCOSE): Status: RESOLVED | Noted: 2022-06-07 | Resolved: 2024-12-26

## 2024-12-26 PROCEDURE — 99214 OFFICE O/P EST MOD 30 MIN: CPT | Performed by: FAMILY MEDICINE

## 2024-12-26 PROCEDURE — 3074F SYST BP LT 130 MM HG: CPT | Performed by: FAMILY MEDICINE

## 2024-12-26 PROCEDURE — 3008F BODY MASS INDEX DOCD: CPT | Performed by: FAMILY MEDICINE

## 2024-12-26 PROCEDURE — 99396 PREV VISIT EST AGE 40-64: CPT | Performed by: FAMILY MEDICINE

## 2024-12-26 PROCEDURE — 3079F DIAST BP 80-89 MM HG: CPT | Performed by: FAMILY MEDICINE

## 2024-12-26 RX ORDER — PRAVASTATIN SODIUM 10 MG/1
10 TABLET ORAL NIGHTLY
Qty: 90 TABLET | Refills: 3 | Status: SHIPPED | OUTPATIENT
Start: 2024-12-26 | End: 2025-12-26

## 2024-12-26 RX ORDER — AMLODIPINE BESYLATE 5 MG/1
5 TABLET ORAL DAILY
Qty: 90 TABLET | Refills: 1 | Status: SHIPPED | OUTPATIENT
Start: 2024-12-26 | End: 2025-06-24

## 2024-12-26 ASSESSMENT — ENCOUNTER SYMPTOMS
CONSTITUTIONAL NEGATIVE: 1
PSYCHIATRIC NEGATIVE: 1
HEADACHES: 1
EYES NEGATIVE: 1
CARDIOVASCULAR NEGATIVE: 1
MUSCULOSKELETAL NEGATIVE: 1
GASTROINTESTINAL NEGATIVE: 1
HEMATOLOGIC/LYMPHATIC NEGATIVE: 1
RESPIRATORY NEGATIVE: 1
ALLERGIC/IMMUNOLOGIC NEGATIVE: 1

## 2024-12-26 ASSESSMENT — PATIENT HEALTH QUESTIONNAIRE - PHQ9
1. LITTLE INTEREST OR PLEASURE IN DOING THINGS: NOT AT ALL
2. FEELING DOWN, DEPRESSED OR HOPELESS: NOT AT ALL
SUM OF ALL RESPONSES TO PHQ9 QUESTIONS 1 AND 2: 0

## 2024-12-26 ASSESSMENT — COLUMBIA-SUICIDE SEVERITY RATING SCALE - C-SSRS
1. IN THE PAST MONTH, HAVE YOU WISHED YOU WERE DEAD OR WISHED YOU COULD GO TO SLEEP AND NOT WAKE UP?: NO
6. HAVE YOU EVER DONE ANYTHING, STARTED TO DO ANYTHING, OR PREPARED TO DO ANYTHING TO END YOUR LIFE?: NO
2. HAVE YOU ACTUALLY HAD ANY THOUGHTS OF KILLING YOURSELF?: NO

## 2024-12-26 NOTE — PROGRESS NOTES
Subjective   Patient ID: Monica Durham is a 64 y.o. female who presents for Annual Exam (Pt stopped taking pravastatin and after seeing results started again /Pt has cardiologist  ).    COVID-19 and tetanus (2016) vaccinations are UTD.  She declines all vaccinations as recommended by her immunologist.  She had a hysterectomy in 2004.  She requests an order for a screening mammogram today.  DEXA is due next month, and she agrees to receive an order today.  She had a colonoscopy in 2019 performed by Dr. White.  She had one benign polyp.  She was told to repeat the colonoscopy in 10 years.  EKG is UTD.  Fasting labs were completed recently.   1) Depression: stable, compliant with bupropion and gabapentin, under the care of psychiatrist (Deann Zelaya PA-C), seen every 3 months.    2) HTN: controlled, compliant with lisinopril and amlodipine, checks blood pressure at home and usually in the 110s/70s, tries to follow a low salt diet, exercises at least 4-5 days a week (aerobics and yoga).  3) Hypercholesterolemia: uncontrolled, resumed pravastatin when she saw her recent lipid panel results, tries to follow a low fat diet, exercises on a regular basis.    4) Hypothyroidism: controlled, TSH normal, compliant with levothyroxine, asymptomatic.  5) Migraines: stable, usually has migraine after monthly infusions at immunologist's office, sumatriptan nasal spray helps.  6) Immunological deficiency syndrome: stable, monthly infusions, under the care of immunologist (Dr. BOLAÑOS).      Review of Systems   Constitutional: Negative.    HENT: Negative.     Eyes: Negative.    Respiratory: Negative.     Cardiovascular: Negative.    Gastrointestinal: Negative.    Genitourinary: Negative.    Musculoskeletal: Negative.    Skin: Negative.    Allergic/Immunologic: Negative.    Neurological:  Positive for headaches.   Hematological: Negative.    Psychiatric/Behavioral: Negative.       Objective   /83 (BP  "Location: Left arm, Patient Position: Sitting, BP Cuff Size: Adult)   Pulse 68   Ht 1.6 m (5' 2.99\")   Wt 88 kg (194 lb)   SpO2 96%   BMI 34.38 kg/m²     Physical Exam  Vitals and nursing note reviewed.   Constitutional:       Appearance: Normal appearance. She is obese.   HENT:      Head: Normocephalic and atraumatic.      Right Ear: Tympanic membrane, ear canal and external ear normal.      Left Ear: Tympanic membrane, ear canal and external ear normal.      Nose: Nose normal.      Mouth/Throat:      Mouth: Mucous membranes are moist.      Pharynx: Oropharynx is clear.   Eyes:      Extraocular Movements: Extraocular movements intact.      Conjunctiva/sclera: Conjunctivae normal.      Pupils: Pupils are equal, round, and reactive to light.   Neck:      Vascular: No carotid bruit.      Comments: No thyromegaly  Cardiovascular:      Rate and Rhythm: Normal rate and regular rhythm.      Pulses: Normal pulses.      Heart sounds: Normal heart sounds.   Pulmonary:      Effort: Pulmonary effort is normal.      Breath sounds: Normal breath sounds.   Abdominal:      General: Bowel sounds are normal.      Palpations: Abdomen is soft.   Musculoskeletal:         General: Normal range of motion.      Cervical back: Normal range of motion and neck supple.      Comments: 5/5 muscle strength x 4 extremities.   Skin:     General: Skin is warm and dry.   Neurological:      General: No focal deficit present.      Mental Status: She is alert and oriented to person, place, and time.   Psychiatric:         Mood and Affect: Mood normal.         Behavior: Behavior normal.     Assessment/Plan   Diagnoses and all orders for this visit:  Annual physical exam  PE completed.  Tetanus and COVID-19 vaccinations UTD.  Patient declines any further vaccinations.  Colon cancer screening UTD.  EKG UTD.  Fasting labs completed recently.  Repeat exam in 1 year.    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  Keep follow-up appointments with psychiatrist.  Follow up in 1 year.   Essential hypertension  Controlled  Continue with lisinopril.  AmLODIPine (Norvasc) 5 mg tablet refilled.  Continue to check blood pressure at home.  Low salt diet.  Regular exercise.  Manage weight.  Follow up in 6 months.   Hypercholesteremia  Uncontrolled  Pravastatin (Pravachol) 10 mg tablet refilled.  Low fat diet.  Regular exercise.  Manage weight.  Follow up in 1 year.   Hypothyroidism, unspecified type  Controlled  TSH normal.  Continue with levothyroxine.  Follow up in 1 year.   Migraine without aura and without status migrainosus, not intractable  Stable  Continue with sumatriptan as needed.    Follow up in 1 year.  Immunologic deficiency syndrome (Multi)  Stable  Continue with infusions.  Keep follow-up appointments with immunologist.  Await input.  Follow up in 1 year.   Asymptomatic postmenopausal state  XR DEXA bone density ordered.  Will notify with results.  Encounter for screening mammogram for breast cancer  BI mammo bilateral screening tomosynthesis ordered.  Await results.  Other orders  -     Follow Up In Primary Care - Health Maintenance  -     Follow Up In Primary Care - Medicare Annual; Future  -     Follow Up In Primary Care - Established; Future

## 2024-12-30 DIAGNOSIS — E78.00 HYPERCHOLESTEREMIA: ICD-10-CM

## 2025-01-01 RX ORDER — PRAVASTATIN SODIUM 10 MG/1
10 TABLET ORAL NIGHTLY
Qty: 90 TABLET | Refills: 3 | OUTPATIENT
Start: 2025-01-01

## 2025-01-02 ENCOUNTER — TELEPHONE (OUTPATIENT)
Dept: PRIMARY CARE | Facility: CLINIC | Age: 65
End: 2025-01-02
Payer: COMMERCIAL

## 2025-01-02 NOTE — TELEPHONE ENCOUNTER
Monique from [facility] calling [phone] reference # [id number]. They have a medication question about Bupropion  mg.refill date of 12/24/24 by KENY. Another doctor filled Bupropion  mg 90 day supply on 11/22/24. Is patient supposed to be taking both? Or should one be stopped?

## 2025-02-19 DIAGNOSIS — I10 ESSENTIAL HYPERTENSION: ICD-10-CM

## 2025-02-19 RX ORDER — LISINOPRIL 20 MG/1
20 TABLET ORAL 2 TIMES DAILY
Qty: 180 TABLET | Refills: 1 | Status: SHIPPED | OUTPATIENT
Start: 2025-02-19

## 2025-02-23 DIAGNOSIS — M25.551 RIGHT HIP PAIN: ICD-10-CM

## 2025-02-24 RX ORDER — MELOXICAM 15 MG/1
15 TABLET ORAL DAILY
Qty: 60 TABLET | Refills: 5 | Status: SHIPPED | OUTPATIENT
Start: 2025-02-24

## 2025-03-20 DIAGNOSIS — E03.9 HYPOTHYROIDISM, UNSPECIFIED TYPE: ICD-10-CM

## 2025-03-20 RX ORDER — LEVOTHYROXINE SODIUM 75 UG/1
TABLET ORAL
Qty: 90 TABLET | Refills: 2 | Status: SHIPPED | OUTPATIENT
Start: 2025-03-20

## 2025-03-31 ENCOUNTER — TELEPHONE (OUTPATIENT)
Dept: PRIMARY CARE | Facility: CLINIC | Age: 65
End: 2025-03-31
Payer: COMMERCIAL

## 2025-03-31 NOTE — TELEPHONE ENCOUNTER
Spoke with pt to clarify medication was sent in on 12-26-24 with a whole years whole supply to contact pharmacy she understood and will call them

## 2025-03-31 NOTE — TELEPHONE ENCOUNTER
Rx Refill Request Telephone Encounter    Name:  Monica Durham  :  [id number]  Medication Name:  Pravastatin 10 mg, but states pt is allergic to Simvastatin please clarify            Specific Pharmacy location:  [facility]   Date of last appointment:    Date of next appointment:    Best number to reach patient:

## 2025-06-25 LAB
ALBUMIN SERPL-MCNC: 4.1 G/DL (ref 3.6–5.1)
ALP SERPL-CCNC: 51 U/L (ref 37–153)
ALT SERPL-CCNC: 15 U/L (ref 6–29)
ANION GAP SERPL CALCULATED.4IONS-SCNC: 7 MMOL/L (CALC) (ref 7–17)
AST SERPL-CCNC: 18 U/L (ref 10–35)
BILIRUB SERPL-MCNC: 0.4 MG/DL (ref 0.2–1.2)
BUN SERPL-MCNC: 22 MG/DL (ref 7–25)
CALCIUM SERPL-MCNC: 9.3 MG/DL (ref 8.6–10.4)
CHLORIDE SERPL-SCNC: 107 MMOL/L (ref 98–110)
CHOLEST SERPL-MCNC: 193 MG/DL
CHOLEST/HDLC SERPL: 3.9 (CALC)
CO2 SERPL-SCNC: 25 MMOL/L (ref 20–32)
CREAT SERPL-MCNC: 1.04 MG/DL (ref 0.5–1.05)
EGFRCR SERPLBLD CKD-EPI 2021: 60 ML/MIN/1.73M2
GLUCOSE SERPL-MCNC: 88 MG/DL (ref 65–99)
HDLC SERPL-MCNC: 50 MG/DL
LDLC SERPL CALC-MCNC: 117 MG/DL (CALC)
NONHDLC SERPL-MCNC: 143 MG/DL (CALC)
POTASSIUM SERPL-SCNC: 4.3 MMOL/L (ref 3.5–5.3)
PROT SERPL-MCNC: 7.8 G/DL (ref 6.1–8.1)
SODIUM SERPL-SCNC: 139 MMOL/L (ref 135–146)
TRIGL SERPL-MCNC: 151 MG/DL

## 2025-06-27 PROBLEM — E78.2 MIXED HYPERLIPIDEMIA: Status: ACTIVE | Noted: 2025-06-27

## 2025-06-27 PROBLEM — E78.2 MIXED HYPERLIPIDEMIA: Status: ACTIVE | Noted: 2018-08-07

## 2025-06-27 PROBLEM — E78.5 DYSLIPIDEMIA: Status: RESOLVED | Noted: 2023-12-06 | Resolved: 2025-06-27

## 2025-06-27 PROBLEM — E78.6 HDL DEFICIENCY: Status: RESOLVED | Noted: 2022-06-07 | Resolved: 2025-06-27

## 2025-07-02 ENCOUNTER — APPOINTMENT (OUTPATIENT)
Dept: PRIMARY CARE | Facility: CLINIC | Age: 65
End: 2025-07-02
Payer: COMMERCIAL

## 2025-07-02 VITALS
HEART RATE: 68 BPM | BODY MASS INDEX: 33.13 KG/M2 | SYSTOLIC BLOOD PRESSURE: 116 MMHG | DIASTOLIC BLOOD PRESSURE: 75 MMHG | TEMPERATURE: 97.2 F | WEIGHT: 187 LBS | HEIGHT: 63 IN | OXYGEN SATURATION: 94 %

## 2025-07-02 DIAGNOSIS — I10 ESSENTIAL HYPERTENSION: Primary | ICD-10-CM

## 2025-07-02 DIAGNOSIS — E78.2 MIXED HYPERLIPIDEMIA: ICD-10-CM

## 2025-07-02 DIAGNOSIS — K40.90 RIGHT INGUINAL HERNIA: ICD-10-CM

## 2025-07-02 PROCEDURE — 99214 OFFICE O/P EST MOD 30 MIN: CPT | Performed by: FAMILY MEDICINE

## 2025-07-02 PROCEDURE — 3008F BODY MASS INDEX DOCD: CPT | Performed by: FAMILY MEDICINE

## 2025-07-02 PROCEDURE — 3078F DIAST BP <80 MM HG: CPT | Performed by: FAMILY MEDICINE

## 2025-07-02 PROCEDURE — 3074F SYST BP LT 130 MM HG: CPT | Performed by: FAMILY MEDICINE

## 2025-07-02 RX ORDER — AMLODIPINE BESYLATE 5 MG/1
5 TABLET ORAL DAILY
Qty: 90 TABLET | Refills: 1 | Status: SHIPPED | OUTPATIENT
Start: 2025-07-02 | End: 2025-12-29

## 2025-07-02 RX ORDER — HYDROCHLOROTHIAZIDE 25 MG/1
25 TABLET ORAL DAILY
COMMUNITY

## 2025-07-02 RX ORDER — LISINOPRIL AND HYDROCHLOROTHIAZIDE 10; 12.5 MG/1; MG/1
1 TABLET ORAL DAILY
Qty: 90 TABLET | Refills: 1 | Status: SHIPPED | OUTPATIENT
Start: 2025-07-02 | End: 2025-12-29

## 2025-07-02 RX ORDER — BUPROPION HYDROCHLORIDE 300 MG/1
300 TABLET ORAL EVERY MORNING
COMMUNITY
Start: 2025-05-27

## 2025-07-02 ASSESSMENT — COLUMBIA-SUICIDE SEVERITY RATING SCALE - C-SSRS
2. HAVE YOU ACTUALLY HAD ANY THOUGHTS OF KILLING YOURSELF?: NO
1. IN THE PAST MONTH, HAVE YOU WISHED YOU WERE DEAD OR WISHED YOU COULD GO TO SLEEP AND NOT WAKE UP?: NO
6. HAVE YOU EVER DONE ANYTHING, STARTED TO DO ANYTHING, OR PREPARED TO DO ANYTHING TO END YOUR LIFE?: NO

## 2025-07-02 ASSESSMENT — ENCOUNTER SYMPTOMS
CARDIOVASCULAR NEGATIVE: 1
RESPIRATORY NEGATIVE: 1

## 2025-07-02 NOTE — PROGRESS NOTES
"Subjective   Patient ID: Monica Durham is a 64 y.o. female who presents for Hypertension (Pt did labs /Pt started taking hydroCHLOROthiazide (HYDRODiuril) 25 mg tablet and would like that prescribed or combined with BP medication  /) and Hyperlipidemia.    1) HTN: controlled, compliant with lisinopril and amlodipine, began taking hydrochlorothiazide 25 mg daily in February when she noticed ankle swelling in the evenings, checks blood pressure at home and usually in the 110s-120s/70s-80s, tries to follow a low salt diet, exercises at least 4-5 days a week (aerobics and yoga).  2) Hypercholesterolemia: uncontrolled but improved, compliant with pravastatin, tries to follow a low fat diet, exercises on a regular basis.    3) Right inguinal hernia: noticed by Dr. Price a few years ago, now having more discomfort and will consult Dr. Price for surgical repair.      Review of Systems   Respiratory: Negative.     Cardiovascular: Negative.    Gastrointestinal:         Positive for right hernia.     Objective   /75 (BP Location: Left arm, Patient Position: Sitting, BP Cuff Size: Adult)   Pulse 68   Temp 36.2 °C (97.2 °F) (Temporal)   Ht 1.6 m (5' 2.99\")   Wt 84.8 kg (187 lb)   SpO2 94%   BMI 33.14 kg/m²     Physical Exam  Vitals and nursing note reviewed.   Constitutional:       General: She is not in acute distress.     Appearance: Normal appearance. She is obese. She is not ill-appearing.   Cardiovascular:      Rate and Rhythm: Normal rate and regular rhythm.      Heart sounds: Normal heart sounds.   Pulmonary:      Effort: Pulmonary effort is normal.      Breath sounds: Normal breath sounds.   Neurological:      Mental Status: She is alert.     Assessment/Plan   Diagnoses and all orders for this visit:  Essential hypertension  Controlled  LisinopriL-hydrochlorothiazide 10-12.5 mg tablet and amLODIPine (Norvasc) 5 mg tablet refilled.  Continue to monitor blood pressure.  Low salt diet.  Regular " exercise.  Manage weight.  Follow up in December for CPE.  Mixed hyperlipidemia  Uncontrolled but improved.  Continue with pravastatin.  Low fat diet.  Regular exercise.  Manage weight.  Follow up in December for CPE.  Right inguinal hernia  Worsening  Patient will consult general surgeon.  Await input.  Follow up as directed.   Other orders  -     Follow Up In Primary Care - Established

## 2025-07-14 ENCOUNTER — TELEPHONE (OUTPATIENT)
Dept: PRIMARY CARE | Facility: CLINIC | Age: 65
End: 2025-07-14
Payer: MEDICARE

## 2025-07-14 DIAGNOSIS — I10 ESSENTIAL HYPERTENSION: Primary | ICD-10-CM

## 2025-07-14 RX ORDER — LISINOPRIL AND HYDROCHLOROTHIAZIDE 12.5; 2 MG/1; MG/1
1 TABLET ORAL 2 TIMES DAILY
Qty: 180 TABLET | Refills: 1 | Status: SHIPPED | OUTPATIENT
Start: 2025-07-14 | End: 2026-01-10

## 2025-07-14 NOTE — TELEPHONE ENCOUNTER
Patient use to be on 20 mg of her lisinopril twice a day and now that is was switched to Lisinopril-hydrochlorothiazide it is only 10 mg once a day and she wants to make sure that is correct?

## 2025-08-12 ENCOUNTER — TELEPHONE (OUTPATIENT)
Dept: PRIMARY CARE | Facility: CLINIC | Age: 65
End: 2025-08-12
Payer: MEDICARE

## 2025-08-12 ENCOUNTER — OFFICE VISIT (OUTPATIENT)
Dept: SURGERY | Facility: CLINIC | Age: 65
End: 2025-08-12
Payer: MEDICARE

## 2025-08-12 VITALS
OXYGEN SATURATION: 97 % | HEART RATE: 71 BPM | RESPIRATION RATE: 17 BRPM | WEIGHT: 187 LBS | BODY MASS INDEX: 33.13 KG/M2 | HEIGHT: 63 IN | DIASTOLIC BLOOD PRESSURE: 80 MMHG | TEMPERATURE: 97.6 F | SYSTOLIC BLOOD PRESSURE: 121 MMHG

## 2025-08-12 DIAGNOSIS — K42.9 UMBILICAL HERNIA WITHOUT OBSTRUCTION AND WITHOUT GANGRENE: ICD-10-CM

## 2025-08-12 DIAGNOSIS — G43.009 MIGRAINE WITHOUT AURA AND WITHOUT STATUS MIGRAINOSUS, NOT INTRACTABLE: ICD-10-CM

## 2025-08-12 DIAGNOSIS — K40.90 RIGHT INGUINAL HERNIA: Primary | ICD-10-CM

## 2025-08-12 PROCEDURE — 3074F SYST BP LT 130 MM HG: CPT | Performed by: SURGERY

## 2025-08-12 PROCEDURE — 3008F BODY MASS INDEX DOCD: CPT | Performed by: SURGERY

## 2025-08-12 PROCEDURE — 3079F DIAST BP 80-89 MM HG: CPT | Performed by: SURGERY

## 2025-08-12 PROCEDURE — 99205 OFFICE O/P NEW HI 60 MIN: CPT | Performed by: SURGERY

## 2025-08-12 PROCEDURE — 99215 OFFICE O/P EST HI 40 MIN: CPT | Performed by: SURGERY

## 2025-08-12 RX ORDER — SUMATRIPTAN 20 MG/1
SPRAY NASAL
Qty: 3 EACH | Refills: 0 | Status: SHIPPED | OUTPATIENT
Start: 2025-08-12

## 2025-08-12 RX ORDER — CEFAZOLIN SODIUM 2 G/100ML
2 INJECTION, SOLUTION INTRAVENOUS ONCE
OUTPATIENT
Start: 2025-08-12 | End: 2025-08-12

## 2025-08-12 ASSESSMENT — LIFESTYLE VARIABLES
SKIP TO QUESTIONS 9-10: 1
HOW MANY STANDARD DRINKS CONTAINING ALCOHOL DO YOU HAVE ON A TYPICAL DAY: 1 OR 2
HOW OFTEN DO YOU HAVE SIX OR MORE DRINKS ON ONE OCCASION: NEVER
AUDIT-C TOTAL SCORE: 2
HOW OFTEN DO YOU HAVE A DRINK CONTAINING ALCOHOL: 2-4 TIMES A MONTH

## 2025-08-12 ASSESSMENT — ENCOUNTER SYMPTOMS
DEPRESSION: 0
OCCASIONAL FEELINGS OF UNSTEADINESS: 0
LOSS OF SENSATION IN FEET: 0

## 2025-08-12 ASSESSMENT — PATIENT HEALTH QUESTIONNAIRE - PHQ9
1. LITTLE INTEREST OR PLEASURE IN DOING THINGS: NOT AT ALL
2. FEELING DOWN, DEPRESSED OR HOPELESS: NOT AT ALL
SUM OF ALL RESPONSES TO PHQ9 QUESTIONS 1 & 2: 0

## 2025-08-12 ASSESSMENT — PAIN SCALES - GENERAL: PAINLEVEL_OUTOF10: 4

## 2025-08-13 ENCOUNTER — APPOINTMENT (OUTPATIENT)
Dept: PREADMISSION TESTING | Facility: HOSPITAL | Age: 65
End: 2025-08-13
Payer: MEDICARE

## 2025-08-13 ENCOUNTER — TELEPHONE (OUTPATIENT)
Dept: PRIMARY CARE | Facility: CLINIC | Age: 65
End: 2025-08-13
Payer: MEDICARE

## 2025-08-13 DIAGNOSIS — G43.009 MIGRAINE WITHOUT AURA AND WITHOUT STATUS MIGRAINOSUS, NOT INTRACTABLE: ICD-10-CM

## 2025-08-19 ENCOUNTER — TELEPHONE (OUTPATIENT)
Dept: PRIMARY CARE | Facility: CLINIC | Age: 65
End: 2025-08-19
Payer: MEDICARE

## 2025-08-19 DIAGNOSIS — G43.009 MIGRAINE WITHOUT AURA AND WITHOUT STATUS MIGRAINOSUS, NOT INTRACTABLE: ICD-10-CM

## 2025-08-19 RX ORDER — SUMATRIPTAN 20 MG/1
SPRAY NASAL
Qty: 18 EACH | Refills: 0 | Status: SHIPPED | OUTPATIENT
Start: 2025-08-19 | End: 2025-11-19

## 2025-08-19 RX ORDER — SUMATRIPTAN 20 MG/1
SPRAY NASAL
Qty: 18 EACH | Refills: 0 | Status: SHIPPED | OUTPATIENT
Start: 2025-08-19 | End: 2025-08-19 | Stop reason: SDUPTHER

## 2025-08-24 DIAGNOSIS — I10 ESSENTIAL HYPERTENSION: ICD-10-CM

## 2025-08-26 RX ORDER — LISINOPRIL 20 MG/1
20 TABLET ORAL 2 TIMES DAILY
Qty: 180 TABLET | Refills: 3 | OUTPATIENT
Start: 2025-08-26

## 2025-09-29 ENCOUNTER — APPOINTMENT (OUTPATIENT)
Dept: PREADMISSION TESTING | Facility: HOSPITAL | Age: 65
End: 2025-09-29
Payer: MEDICARE

## 2025-12-30 ENCOUNTER — APPOINTMENT (OUTPATIENT)
Dept: PRIMARY CARE | Facility: CLINIC | Age: 65
End: 2025-12-30
Payer: COMMERCIAL

## (undated) DEVICE — SUTURE, PROLENE, 3-0, 18 IN, PS2, BLUE

## (undated) DEVICE — DRESSING, TRANSPARENT, TEGADERM, FRAME STYLE, 4 X 4.5, STRL

## (undated) DEVICE — BLANKET, LOWER BODY, VHA PLUS, ADULT

## (undated) DEVICE — GLOVE, PROTEXIS PI CLASSIC, SZ-6.5, PF, LF

## (undated) DEVICE — PROBE, APOLLO RF, 90 DEG, EXTRA LARTGE

## (undated) DEVICE — BANDAGE, ELASTIC, MATRIX, SELF-CLOSURE, 6 IN X 5 YD, LF

## (undated) DEVICE — TUBING, SUCTION, 6MM X 10, CLEAN N-COND

## (undated) DEVICE — NEEDLE, ELECTRODE, ELECTROSURGICAL, INSULATED

## (undated) DEVICE — TUBING, PUMP MAIN 16FT STERILE

## (undated) DEVICE — DRESSING, NON-ADHERENT, 3 X 3 IN, STERILE

## (undated) DEVICE — GLOVE, SURGICAL, PROTEXIS PI , 7.5, PF, LF

## (undated) DEVICE — GLOVE, SURGICAL, PROTEXIS PI BLUE W/NEUTHERA, 6.5, PF, LF

## (undated) DEVICE — Device

## (undated) DEVICE — IMPLANTABLE DEVICE: Type: IMPLANTABLE DEVICE | Site: SHOULDER | Status: NON-FUNCTIONAL

## (undated) DEVICE — SUTURE, MONOCRYL, 3-0, 27 IN, PS-2, UNDYED

## (undated) DEVICE — EXCAL 4MM X 13CM SINGLE

## (undated) DEVICE — GOWN, SURGICAL, SIRUS, NON REINFORCED, LARGE

## (undated) DEVICE — DRESSING, GAUZE, SPONGE, KERLIX, SUPER, 6 X 6.75 IN, STERILE 10PK

## (undated) DEVICE — DRESSING, ABDOMINAL, WET PRUF, TENDERSORB, 5 X 9 IN, STERILE